# Patient Record
Sex: FEMALE | Race: WHITE | NOT HISPANIC OR LATINO | Employment: FULL TIME | URBAN - METROPOLITAN AREA
[De-identification: names, ages, dates, MRNs, and addresses within clinical notes are randomized per-mention and may not be internally consistent; named-entity substitution may affect disease eponyms.]

---

## 2017-07-18 ENCOUNTER — OFFICE VISIT (OUTPATIENT)
Dept: OBSTETRICS AND GYNECOLOGY | Facility: CLINIC | Age: 37
End: 2017-07-18

## 2017-07-18 VITALS
SYSTOLIC BLOOD PRESSURE: 143 MMHG | HEIGHT: 63 IN | BODY MASS INDEX: 30.3 KG/M2 | WEIGHT: 171 LBS | HEART RATE: 95 BPM | DIASTOLIC BLOOD PRESSURE: 82 MMHG

## 2017-07-18 DIAGNOSIS — N64.4 MASTODYNIA OF LEFT BREAST: Primary | ICD-10-CM

## 2017-07-18 PROCEDURE — 99213 OFFICE O/P EST LOW 20 MIN: CPT | Performed by: OBSTETRICS & GYNECOLOGY

## 2017-07-18 RX ORDER — ETONOGESTREL/ETHINYL ESTRADIOL .12-.015MG
RING, VAGINAL VAGINAL
COMMUNITY
Start: 2017-07-01 | End: 2017-08-22

## 2017-07-18 RX ORDER — LEVOTHYROXINE SODIUM 88 MCG
88 TABLET ORAL DAILY
COMMUNITY
Start: 2017-07-15 | End: 2020-11-02

## 2017-07-18 NOTE — PROGRESS NOTES
"Subjective   Iris Dailey is a 37 y.o. female.     Cc:  Left breast pain    History of Present Illness - 37 year old female with left breast pain intermittent for 2 months.  Localized to the upper outer left breast.  No discreet mass.  Patient feels like it is \"scar tissue\" though she has not had previous breast surgery.  She had a mammogram last year that is reported as normal.  No breast discharge.  Patient is not on hormonal medications.    The following portions of the patient's history were reviewed and updated as appropriate:   Her family history includes Colon cancer in her paternal grandfather; Diabetes in her father.  Current Outpatient Prescriptions   Medication Sig Dispense Refill   • NUVARING 0.12-0.015 MG/24HR vaginal ring      • SYNTHROID 88 MCG tablet        No current facility-administered medications for this visit.      She is allergic to codeine..    Review of Systems   Constitutional: Negative for chills and fever.   Hematological:        No nipple discharge.  Positive for breast tenderness in left breast.       Objective   Physical Exam   Constitutional: She appears well-developed.   Pulmonary/Chest: Right breast exhibits no inverted nipple, no mass, no nipple discharge and no skin change. Left breast exhibits tenderness. Left breast exhibits no inverted nipple, no mass, no nipple discharge and no skin change.   Neurological: She is alert. Coordination normal.   Skin: Skin is warm and dry.   Psychiatric: She has a normal mood and affect. Her behavior is normal. Judgment and thought content normal.   Vitals reviewed.      Assessment/Plan   Iris was seen today for breast problem.    Diagnoses and all orders for this visit:    Mastodynia of left breast  -     US breast left complete; Future  -     Mammo Diagnostic Left With CAD; Future  -     No palpable abnormalities today.  However, will schedule for breast testing.    Wilber Han MD                "

## 2017-07-28 ENCOUNTER — HOSPITAL ENCOUNTER (OUTPATIENT)
Dept: ULTRASOUND IMAGING | Facility: HOSPITAL | Age: 37
Discharge: HOME | End: 2017-07-28
Attending: OBSTETRICS & GYNECOLOGY | Admitting: OBSTETRICS & GYNECOLOGY

## 2017-07-28 ENCOUNTER — HOSPITAL ENCOUNTER (OUTPATIENT)
Dept: MAMMOGRAPHY | Facility: HOSPITAL | Age: 37
Discharge: HOME | End: 2017-07-28
Attending: OBSTETRICS & GYNECOLOGY

## 2017-07-28 DIAGNOSIS — N64.4 MASTODYNIA OF LEFT BREAST: ICD-10-CM

## 2017-07-28 PROCEDURE — G0206 DX MAMMO INCL CAD UNI: HCPCS

## 2017-07-28 PROCEDURE — 76642 ULTRASOUND BREAST LIMITED: CPT

## 2017-07-31 DIAGNOSIS — N63.20 BREAST MASS, LEFT: Primary | ICD-10-CM

## 2017-08-02 ENCOUNTER — TELEPHONE (OUTPATIENT)
Dept: OBSTETRICS AND GYNECOLOGY | Facility: CLINIC | Age: 37
End: 2017-08-02

## 2017-08-02 NOTE — TELEPHONE ENCOUNTER
Pt aware of results and waiting for a call from [facility] to be scheduled. If she does not hear back from them today she will give us a call back.

## 2017-08-02 NOTE — TELEPHONE ENCOUNTER
----- Message from Panchito Wright MD sent at 7/31/2017  5:20 PM EDT -----  Notify the patient that they saw small abnormality of the left breast, and radiology has recommended a biopsy to be performed under ultrasound.  I will order this, and the patient should be scheduled to have the procedure performed.

## 2017-08-08 ENCOUNTER — HOSPITAL ENCOUNTER (OUTPATIENT)
Dept: ULTRASOUND IMAGING | Facility: HOSPITAL | Age: 37
Discharge: HOME | End: 2017-08-08
Attending: OBSTETRICS & GYNECOLOGY | Admitting: OBSTETRICS & GYNECOLOGY

## 2017-08-08 VITALS
TEMPERATURE: 98.4 F | OXYGEN SATURATION: 100 % | HEIGHT: 63 IN | BODY MASS INDEX: 29.95 KG/M2 | SYSTOLIC BLOOD PRESSURE: 117 MMHG | DIASTOLIC BLOOD PRESSURE: 79 MMHG | WEIGHT: 169 LBS | RESPIRATION RATE: 16 BRPM | HEART RATE: 79 BPM

## 2017-08-08 DIAGNOSIS — N63.20 BREAST MASS, LEFT: ICD-10-CM

## 2017-08-08 PROCEDURE — A4648 IMPLANTABLE TISSUE MARKER: HCPCS

## 2017-08-08 PROCEDURE — 88305 TISSUE EXAM BY PATHOLOGIST: CPT | Performed by: OBSTETRICS & GYNECOLOGY

## 2017-08-08 RX ORDER — LIDOCAINE HYDROCHLORIDE 10 MG/ML
10 INJECTION, SOLUTION INFILTRATION; PERINEURAL ONCE
Status: COMPLETED | OUTPATIENT
Start: 2017-08-08 | End: 2017-08-08

## 2017-08-08 RX ORDER — LIDOCAINE HYDROCHLORIDE AND EPINEPHRINE 10; 10 MG/ML; UG/ML
10 INJECTION, SOLUTION INFILTRATION; PERINEURAL ONCE
Status: COMPLETED | OUTPATIENT
Start: 2017-08-08 | End: 2017-08-08

## 2017-08-08 RX ADMIN — LIDOCAINE HYDROCHLORIDE,EPINEPHRINE BITARTRATE 10 ML: 10; .01 INJECTION, SOLUTION INFILTRATION; PERINEURAL at 10:40

## 2017-08-08 RX ADMIN — LIDOCAINE HYDROCHLORIDE 10 ML: 10 INJECTION, SOLUTION INFILTRATION; PERINEURAL at 10:40

## 2017-08-08 NOTE — NURSING NOTE
Biopsy site to left lateral breast clear with Skin Affix dry and intact. No firmness or swelling noted at or around biopsy site. Denies pain. Ice pack with protective covering applied to biopsy site. Discharge instructions discussed with understanding voiced by patient. Copies provided to patient. No distress noted. To home via private vehicle.

## 2017-08-09 LAB
CYTO UR: NORMAL
LAB AP CASE REPORT: NORMAL
LAB AP CLINICAL INFORMATION: NORMAL
Lab: NORMAL
PATH REPORT.FINAL DX SPEC: NORMAL
PATH REPORT.GROSS SPEC: NORMAL

## 2017-08-10 ENCOUNTER — TELEPHONE (OUTPATIENT)
Dept: OBSTETRICS AND GYNECOLOGY | Facility: CLINIC | Age: 37
End: 2017-08-10

## 2017-08-10 DIAGNOSIS — D24.2 FIBROADENOMA OF LEFT BREAST: Primary | ICD-10-CM

## 2017-08-10 NOTE — TELEPHONE ENCOUNTER
----- Message from Tony Wright MD sent at 8/10/2017  1:05 PM EDT -----  Notify the patient that her breast biopsy was benign, revealing a type of mass, fibroadenoma.  This is not something cancerous; however, she should be referred to a breast surgeon to discuss follow-up versus removal of the mass. I will order the referral separately.

## 2017-08-15 ENCOUNTER — TELEPHONE (OUTPATIENT)
Dept: OBSTETRICS AND GYNECOLOGY | Facility: CLINIC | Age: 37
End: 2017-08-15

## 2017-08-15 NOTE — TELEPHONE ENCOUNTER
----- Message from Wilber Han MD sent at 8/15/2017 11:49 AM EDT -----  Linnea - Let her know that her breast biopsy results were benign -- No evidence of cancer.  Thanks.

## 2017-08-22 ENCOUNTER — OFFICE VISIT (OUTPATIENT)
Dept: MAMMOGRAPHY | Facility: CLINIC | Age: 37
End: 2017-08-22

## 2017-08-22 VITALS
HEART RATE: 93 BPM | HEIGHT: 63 IN | OXYGEN SATURATION: 98 % | SYSTOLIC BLOOD PRESSURE: 132 MMHG | TEMPERATURE: 97.8 F | DIASTOLIC BLOOD PRESSURE: 70 MMHG | WEIGHT: 171 LBS | BODY MASS INDEX: 30.3 KG/M2

## 2017-08-22 DIAGNOSIS — N63.0 LUMP OR MASS IN BREAST: Primary | ICD-10-CM

## 2017-08-22 PROCEDURE — 99204 OFFICE O/P NEW MOD 45 MIN: CPT | Performed by: SURGERY

## 2017-08-22 RX ORDER — CEFAZOLIN SODIUM 2 G/100ML
2 INJECTION, SOLUTION INTRAVENOUS ONCE
Status: CANCELLED | OUTPATIENT
Start: 2017-09-11 | End: 2017-08-22

## 2017-08-22 RX ORDER — DIAZEPAM 2 MG/1
10 TABLET ORAL ONCE
Status: CANCELLED | OUTPATIENT
Start: 2017-09-11 | End: 2017-08-22

## 2017-08-22 NOTE — PROGRESS NOTES
Chief Complaint: Iris Dailey is a 37 y.o.. female here today for Mass (left breast)        History of Present Illness:  Patient presents with breast mass.  She is a nice 37-year-old white female who has complained of some pain in the upper outer quadrant of the left breast for approximately 2 months.  The pain was more of a shooting type pain extending up into the axilla and is intermittent.  She gives no history of trauma or nipple discharge.  The patient has had a previous excisional breast biopsy in the upper outer quadrant and thought perhaps there was some scar tissue causing the symptoms although she was unable to feel a specific mass.  The patient underwent a mammogram and no abnormalities were detected.  An ultrasound however revealed a 9 mm irregular mass with some peripheral vascularity.  This was felt to be suspicious and biopsy was recommended.  That was undertaken and the pathology report reveals fragments of a fibroepithelial lesion.  There was also some non-atypical duct hyperplasia.  The patient's personal history significant for some hypothyroidism and anxiety.  Her family history is significant for a maternal uncle with lung and thyroid cancer.  There was also a gram father on the paternal side who had colon cancer.  There is no breast cancer history in the family.  I have reviewed the imaging studies and agree with the findings.      Review of Systems:  Review of Systems   Constitutional: Positive for diaphoresis, fatigue and unexpected weight change.        20 pound weight gain over 12 months   Endocrine: Positive for hot flashes.   Skin:        The patient denies any noticeable changes to the skin of the breast.    Neurological: Positive for headaches and numbness.   Psychiatric/Behavioral: Positive for decreased concentration and sleep disturbance. The patient is nervous/anxious.    All other systems reviewed and are negative.       Past Medical and Surgical History:  Breast Biopsy  History:  Patient has had the following breast biopsies:Left Breast 2003 and 2017-benign  Breast Cancer HIstory:  Patient does not have a past medical history of breast cancer.  Breast Operations, and year:  Left Breast Biopsy    History   Smoking Status   • Never Smoker   Smokeless Tobacco   • Not on file     Obstetric History:  Patient is premenopausal, first day of last period: 08/01/17  Number of pregnancies:4  Number of live births: 1  Number of abortions or miscarriages: 3  Age of delivery of first child: 16  Patient did not breast feed.  Length of time taking birth control pills:8-12 months  Patient has never taken hormone replacement    Past Surgical History:   Procedure Laterality Date   • APPENDECTOMY     • BREAST BIOPSY Left 2002    Benign   • KNEE SURGERY         Past Medical History:   Diagnosis Date   • Anxiety    • Hypothyroid        Prior Hospitalizations, other than for surgery or childbirth, and year:  none    Social History:  Patient is single.  Patient has 1 sons.    Family History:  Family History   Problem Relation Age of Onset   • Diabetes Father    • Hyperlipidemia Father    • Hypothyroidism Father    • Hyperlipidemia Mother    • Hyperthyroidism Mother    • Cervical cancer Mother    • Colon cancer Paternal Grandfather    • Colon cancer Paternal Grandmother    • Brain cancer Maternal Uncle    • Thyroid cancer Maternal Uncle        Vital Signs:  Vitals:    08/22/17 1451   BP: 132/70   Pulse: 93   Temp: 97.8 °F (36.6 °C)   SpO2: 98%       Medications:    Current Outpatient Prescriptions:     Current Outpatient Prescriptions:   •  SYNTHROID 88 MCG tablet, , Disp: , Rfl:     Physical Examination:  General Appearance:   Patient is in no distress.  She is well kept and has an overweight build.   Psychiatric:  Patient with appropriate mood and affect. Alert and oriented to self, time, and place.    Breast, RIGHT:  large sized, symmetric with the contralateral side.  Breast skin is without erythema,  edema, rashes.  There are no visible abnormalities upon inspection during the arm-raising maneuver or with hands on hips in the sitting position. There is no nipple retraction, discharge or nipple/areolar skin changes.There are no masses palpable in the sitting or supine positions.    Breast, LEFT:  large sized, symmetric with the contralateral side.  Breast skin is without erythema, edema, rashes.  There are no visible abnormalities upon inspection during the arm-raising maneuver or with hands on hips in the sitting position. There is no nipple retraction, discharge or nipple/areolar skin changes.There is some bruising in the lateral aspect of the breast.  She also has a small incision in the upper outer quadrant and medial and inferior to this was an ill-defined area of thickening measuring approximately 1.5 cm.    Lymphatic:  There is no axillary, cervical, infraclavicular, or supraclavicular adenopathy bilaterally.  Eyes:  Pupils are round and reactive to light.  Cardiovascular:  Heart rate and rhythm are regular.  Respiratory:  Lungs are clear bilaterally with no crackles or wheezes in any lung field.  Gastrointestinal:  Abdomen is soft, nondistended, and nontender.  There was no evidence of hepatosplenomegaly.  There is a right lower quadrant scar from her appendectomy.    Musculoskeletal:  Good strength in all 4 extremities.   There is good range of motion in both shoulders.    Skin:  She has multiple irregular moles on her back and chest.  She does visit a dermatologist yearly.  The patient also has a number of tattoos.    Assessment:  1. Lump or mass in breast          Plan:  The left breast mass most likely represents a fibroadenoma.  I gave her the option of following this with a repeat ultrasound in 6 months versus performing an excisional biopsy utilizing needle localization.  She prefers the latter and I have discussed the procedure in detail with her.  She understands the small risk of infection,  bleeding, and anesthesia.      CPT coding:    Next Appointment:  No Follow-up on file.

## 2017-08-24 ENCOUNTER — TRANSCRIBE ORDERS (OUTPATIENT)
Dept: ADMINISTRATIVE | Facility: HOSPITAL | Age: 37
End: 2017-08-24

## 2017-08-24 DIAGNOSIS — N63.20 LEFT BREAST MASS: Primary | ICD-10-CM

## 2017-08-24 PROBLEM — N63.0 LUMP OR MASS IN BREAST: Status: ACTIVE | Noted: 2017-08-24

## 2017-09-05 ENCOUNTER — OFFICE VISIT (OUTPATIENT)
Dept: OBSTETRICS AND GYNECOLOGY | Facility: CLINIC | Age: 37
End: 2017-09-05

## 2017-09-05 ENCOUNTER — TELEPHONE (OUTPATIENT)
Dept: OBSTETRICS AND GYNECOLOGY | Facility: CLINIC | Age: 37
End: 2017-09-05

## 2017-09-05 ENCOUNTER — APPOINTMENT (OUTPATIENT)
Dept: PREADMISSION TESTING | Facility: HOSPITAL | Age: 37
End: 2017-09-05

## 2017-09-05 VITALS
WEIGHT: 170.5 LBS | HEART RATE: 83 BPM | HEIGHT: 63 IN | DIASTOLIC BLOOD PRESSURE: 78 MMHG | RESPIRATION RATE: 16 BRPM | SYSTOLIC BLOOD PRESSURE: 118 MMHG | BODY MASS INDEX: 30.21 KG/M2 | TEMPERATURE: 97.4 F | OXYGEN SATURATION: 99 %

## 2017-09-05 VITALS
HEART RATE: 82 BPM | BODY MASS INDEX: 29.77 KG/M2 | DIASTOLIC BLOOD PRESSURE: 81 MMHG | HEIGHT: 63 IN | WEIGHT: 168 LBS | SYSTOLIC BLOOD PRESSURE: 125 MMHG

## 2017-09-05 DIAGNOSIS — Z01.419 VISIT FOR GYNECOLOGIC EXAMINATION: Primary | ICD-10-CM

## 2017-09-05 DIAGNOSIS — N63.0 LUMP OR MASS IN BREAST: ICD-10-CM

## 2017-09-05 LAB
ANION GAP SERPL CALCULATED.3IONS-SCNC: 11.1 MMOL/L
BUN BLD-MCNC: 9 MG/DL (ref 6–20)
BUN/CREAT SERPL: 9.5 (ref 7–25)
CALCIUM SPEC-SCNC: 10.1 MG/DL (ref 8.6–10.5)
CHLORIDE SERPL-SCNC: 99 MMOL/L (ref 98–107)
CO2 SERPL-SCNC: 27.9 MMOL/L (ref 22–29)
CREAT BLD-MCNC: 0.95 MG/DL (ref 0.57–1)
DEPRECATED RDW RBC AUTO: 42.2 FL (ref 37–54)
ERYTHROCYTE [DISTWIDTH] IN BLOOD BY AUTOMATED COUNT: 12.7 % (ref 11.7–13)
GFR SERPL CREATININE-BSD FRML MDRD: 65 ML/MIN/1.73
GLUCOSE BLD-MCNC: 93 MG/DL (ref 65–99)
HCG SERPL QL: NEGATIVE
HCT VFR BLD AUTO: 41 % (ref 35.6–45.5)
HGB BLD-MCNC: 13.8 G/DL (ref 11.9–15.5)
MCH RBC QN AUTO: 30.7 PG (ref 26.9–32)
MCHC RBC AUTO-ENTMCNC: 33.7 G/DL (ref 32.4–36.3)
MCV RBC AUTO: 91.1 FL (ref 80.5–98.2)
PLATELET # BLD AUTO: 242 10*3/MM3 (ref 140–500)
PMV BLD AUTO: 10.9 FL (ref 6–12)
POTASSIUM BLD-SCNC: 3.9 MMOL/L (ref 3.5–5.2)
RBC # BLD AUTO: 4.5 10*6/MM3 (ref 3.9–5.2)
SODIUM BLD-SCNC: 138 MMOL/L (ref 136–145)
WBC NRBC COR # BLD: 8.09 10*3/MM3 (ref 4.5–10.7)

## 2017-09-05 PROCEDURE — 80048 BASIC METABOLIC PNL TOTAL CA: CPT | Performed by: SURGERY

## 2017-09-05 PROCEDURE — 36415 COLL VENOUS BLD VENIPUNCTURE: CPT

## 2017-09-05 PROCEDURE — 84703 CHORIONIC GONADOTROPIN ASSAY: CPT | Performed by: SURGERY

## 2017-09-05 PROCEDURE — 99395 PREV VISIT EST AGE 18-39: CPT | Performed by: OBSTETRICS & GYNECOLOGY

## 2017-09-05 PROCEDURE — 85027 COMPLETE CBC AUTOMATED: CPT | Performed by: SURGERY

## 2017-09-05 RX ORDER — ETONOGESTREL/ETHINYL ESTRADIOL .12-.015MG
1 RING, VAGINAL VAGINAL
Qty: 1 EACH | Refills: 11 | Status: SHIPPED | OUTPATIENT
Start: 2017-09-05 | End: 2017-12-15

## 2017-09-05 NOTE — PROGRESS NOTES
[facility]  [address], [address]  [place], [place] [address]  Phone: [phone] / Fax:  [phone]      2017    [address] [place] [address]    FARIBA Sampson    Chief Complaint   Patient presents with   • Gynecologic Exam     Annual Exam, pt scheduled for a left breast bx next week.       Iris Dailey is here for annual gynecologic exam.  HPI - Patient uses Nuva Ring but not on it currently.  She wants to restart.  Patient to have breast biopsy next week for potentially precancerous lesion.      Past Medical History:   Diagnosis Date   • Anxiety    • Hypothyroid        Past Surgical History:   Procedure Laterality Date   • APPENDECTOMY     • BREAST BIOPSY Left     Benign   • KNEE SURGERY         Allergies   Allergen Reactions   • Codeine        Social History     Social History   • Marital status: Single     Spouse name: N/A   • Number of children: N/A   • Years of education: N/A     Occupational History   • Not on file.     Social History Main Topics   • Smoking status: Never Smoker   • Smokeless tobacco: Not on file   • Alcohol use No   • Drug use: No   • Sexual activity: Yes     Partners: Male     Birth control/ protection: None      Comment: single     Other Topics Concern   • Not on file     Social History Narrative       Family History   Problem Relation Age of Onset   • Diabetes Father    • Hyperlipidemia Father    • Hypothyroidism Father    • Hyperlipidemia Mother    • Hyperthyroidism Mother    • Cervical cancer Mother    • Colon cancer Paternal Grandfather    • Colon cancer Paternal Grandmother    • Brain cancer Maternal Uncle    • Thyroid cancer Maternal Uncle        Patient's last menstrual period was 2017 (exact date).    OB History      Para Term  AB TAB SAB Ectopic Multiple Living    4 1 1  3  3   1        Obstetric Comments    Took birth control 8-12 months.          Vitals:    17 1135   BP: 125/81   Pulse: 82   Weight: 168 lb (76.2  "kg)   Height: 63\" (160 cm)       Physical Exam   Constitutional: She appears well-developed and well-nourished.   Genitourinary: Vagina normal and uterus normal. Pelvic exam was performed with patient supine. There is no tenderness or lesion on the right labia. There is no tenderness or lesion on the left labia. Right adnexum does not display tenderness and does not display fullness. Left adnexum does not display tenderness and does not display fullness. Cervix does not exhibit motion tenderness or lesion.   HENT:   Head: Normocephalic.   Nose: Nose normal.   Eyes: Conjunctivae are normal.   Neck: Normal range of motion. Neck supple. No thyromegaly present.   Cardiovascular: Normal rate, regular rhythm and normal heart sounds.    Pulmonary/Chest: Effort normal. She has no wheezes. She has no rales. Right breast exhibits no mass, no nipple discharge and no skin change. Left breast exhibits no mass, no nipple discharge and no skin change.   Abdominal: Soft. There is no tenderness. There is no rebound and no guarding.   Musculoskeletal: Normal range of motion. She exhibits no edema.   Neurological: She is alert. She displays normal reflexes. Coordination normal.   Psychiatric: She has a normal mood and affect. Her behavior is normal. Judgment and thought content normal.   Vitals reviewed.      Iris was seen today for gynecologic exam.    Diagnoses and all orders for this visit:    Visit for gynecologic examination  -     NUVARING 0.12-0.015 MG/24HR vaginal ring; Insert 1 each into the vagina Every 28 (Twenty-Eight) Days.  -     Discussed importance of regular screening.  Lump or mass in breast  -     Breast biopsy next week.   -     Breast awareness discussed.        Wilber Han MD      "

## 2017-09-05 NOTE — TELEPHONE ENCOUNTER
----- Message from Wilber Han MD sent at 9/5/2017  2:28 PM EDT -----  Shahrzad    21 days with 7 days off.    Thanks    Guille  ----- Message -----     From: Shahrzad Ramirez     Sent: 9/5/2017   2:07 PM       To: MD Aracelis Guzman @ [facility] wanting to know if Rx Nuvaring should be 21 days with 7 days of being out or if you wanted it to be 28 days straight, please advise.

## 2017-09-05 NOTE — DISCHARGE INSTRUCTIONS
Take the following medications the morning of surgery with a small sip of water:  NONE    Arrive to hospital on your day of surgery at 10:30 AM.    General Instructions:  • Do not eat solid food after midnight the night before surgery.  • You may drink clear liquids day of surgery but must stop at least one hour before your hospital arrival time 9:30 AM.  • It is beneficial for you to have a clear drink that contains carbohydrates the day of surgery.  We suggest a 20 ounce bottle of Gatorade or Powerade for non-diabetic patients or a 20 ounce bottle of G2 or Powerade Zero for diabetic patients. (Pediatric patients, are not advised to drink a 20 ounce carbohydrate drink)    Clear liquids are liquids you can see through.  Nothing red in color.     Plain water                               Sports drinks  Sodas                                   Gelatin (Jell-O)  Fruit juices without pulp such as white grape juice and apple juice  Popsicles that contain no fruit or yogurt  Tea or coffee (no cream or milk added)  Gatorade / Powerade  G2 / Powerade Zero    • Infants may have breast milk up to four hours before surgery.  • Infants drinking formula may drink formula up to six hours before surgery.   • Patients who avoid smoking, chewing tobacco and alcohol for 4 weeks prior to surgery have a reduced risk of post-operative complications.  Quit smoking as many days before surgery as you can.  • Do not smoke, use chewing tobacco or drink alcohol the day of surgery.   • If applicable bring your C-PAP/ BI-PAP machine.  • Bring any papers given to you in the doctor’s office.  • Wear clean comfortable clothes and socks.  • Do not wear contact lenses or make-up.  Bring a case for your glasses.   • Bring crutches or walker if applicable.  • Leave all other valuables and jewelry at home.  • The Pre-Admission Testing nurse will instruct you to bring medications if unable to obtain an accurate list in Pre-Admission Testing.        If  you were given a blood bank ID arm band remember to bring it with you the day of surgery.    Preventing a Surgical Site Infection:  • For 2 to 3 days before surgery, avoid shaving with a razor because the razor can irritate skin and make it easier to develop an infection.  • The night prior to surgery sleep in a clean bed with clean clothing.  Do not allow pets to sleep with you.  • Shower on the morning of surgery using a fresh bar of anti-bacterial soap (such as Dial) and clean washcloth.  Dry with a clean towel and dress in clean clothing.  • Ask your surgeon if you will be receiving antibiotics prior to surgery.  • Make sure you, your family, and all healthcare providers clean their hands with soap and water or an alcohol based hand  before caring for you or your wound.    Day of surgery:  Upon arrival, a Pre-op nurse and Anesthesiologist will review your health history, obtain vital signs, and answer questions you may have.  The only belongings needed at this time will be your home medications and if applicable your C-PAP/BI-PAP machine.  If you are staying overnight your family can leave the rest of your belongings in the car and bring them to your room later.  A Pre-op nurse will start an IV and you may receive medication in preparation for surgery, including something to help you relax.  Your family will be able to see you in the Pre-op area.  While you are in surgery your family should notify the waiting room  if they leave the waiting room area and provide a contact phone number.    Please be aware that surgery does come with discomfort.  We want to make every effort to control your discomfort so please discuss any uncontrolled symptoms with your nurse.   Your doctor will most likely have prescribed pain medications.      If you are going home after surgery you will receive individualized written care instructions before being discharged.  A responsible adult must drive you to and from  the hospital on the day of your surgery and stay with you for 24 hours.    If you are staying overnight following surgery, you will be transported to your hospital room following the recovery period.  [facility] [place] [facility] has all private rooms.    If you have any questions please call Pre-Admission Testing at [phone].  Deductibles and co-payments are collected on the day of service. Please be prepared to pay the required co-pay, deductible or deposit on the day of service as defined by your plan.

## 2017-09-11 ENCOUNTER — HOSPITAL ENCOUNTER (OUTPATIENT)
Facility: HOSPITAL | Age: 37
Setting detail: HOSPITAL OUTPATIENT SURGERY
Discharge: HOME | End: 2017-09-11
Attending: SURGERY | Admitting: SURGERY

## 2017-09-11 ENCOUNTER — HOSPITAL ENCOUNTER (OUTPATIENT)
Dept: MAMMOGRAPHY | Facility: HOSPITAL | Age: 37
Discharge: HOME | End: 2017-09-11
Attending: SURGERY

## 2017-09-11 ENCOUNTER — ANESTHESIA EVENT (OUTPATIENT)
Dept: PERIOP | Facility: HOSPITAL | Age: 37
End: 2017-09-11

## 2017-09-11 ENCOUNTER — APPOINTMENT (OUTPATIENT)
Dept: GENERAL RADIOLOGY | Facility: HOSPITAL | Age: 37
End: 2017-09-11

## 2017-09-11 ENCOUNTER — ANESTHESIA (OUTPATIENT)
Dept: PERIOP | Facility: HOSPITAL | Age: 37
End: 2017-09-11

## 2017-09-11 VITALS
BODY MASS INDEX: 30.29 KG/M2 | SYSTOLIC BLOOD PRESSURE: 119 MMHG | RESPIRATION RATE: 16 BRPM | TEMPERATURE: 97.9 F | WEIGHT: 171 LBS | HEART RATE: 84 BPM | OXYGEN SATURATION: 99 % | DIASTOLIC BLOOD PRESSURE: 78 MMHG

## 2017-09-11 DIAGNOSIS — N63.20 LEFT BREAST MASS: ICD-10-CM

## 2017-09-11 DIAGNOSIS — N63.0 LUMP OR MASS IN BREAST: ICD-10-CM

## 2017-09-11 PROCEDURE — 25010000002 ONDANSETRON PER 1 MG: Performed by: NURSE ANESTHETIST, CERTIFIED REGISTERED

## 2017-09-11 PROCEDURE — 88307 TISSUE EXAM BY PATHOLOGIST: CPT | Performed by: SURGERY

## 2017-09-11 PROCEDURE — 25010000002 DEXAMETHASONE PER 1 MG: Performed by: NURSE ANESTHETIST, CERTIFIED REGISTERED

## 2017-09-11 PROCEDURE — 25010000002 MIDAZOLAM PER 1 MG: Performed by: ANESTHESIOLOGY

## 2017-09-11 PROCEDURE — 19125 EXCISION BREAST LESION: CPT | Performed by: SURGERY

## 2017-09-11 PROCEDURE — 25010000002 PROPOFOL 10 MG/ML EMULSION: Performed by: NURSE ANESTHETIST, CERTIFIED REGISTERED

## 2017-09-11 PROCEDURE — 25010000003 CEFAZOLIN IN DEXTROSE 2-4 GM/100ML-% SOLUTION: Performed by: SURGERY

## 2017-09-11 PROCEDURE — 25010000002 FENTANYL CITRATE (PF) 100 MCG/2ML SOLUTION: Performed by: NURSE ANESTHETIST, CERTIFIED REGISTERED

## 2017-09-11 PROCEDURE — 76098 X-RAY EXAM SURGICAL SPECIMEN: CPT

## 2017-09-11 RX ORDER — DIPHENHYDRAMINE HYDROCHLORIDE 50 MG/ML
12.5 INJECTION INTRAMUSCULAR; INTRAVENOUS
Status: CANCELLED | OUTPATIENT
Start: 2017-09-11

## 2017-09-11 RX ORDER — FLUMAZENIL 0.1 MG/ML
0.2 INJECTION INTRAVENOUS AS NEEDED
Status: CANCELLED | OUTPATIENT
Start: 2017-09-11

## 2017-09-11 RX ORDER — NALOXONE HCL 0.4 MG/ML
0.2 VIAL (ML) INJECTION AS NEEDED
Status: CANCELLED | OUTPATIENT
Start: 2017-09-11

## 2017-09-11 RX ORDER — MIDAZOLAM HYDROCHLORIDE 1 MG/ML
1 INJECTION INTRAMUSCULAR; INTRAVENOUS
Status: DISCONTINUED | OUTPATIENT
Start: 2017-09-11 | End: 2017-09-11 | Stop reason: HOSPADM

## 2017-09-11 RX ORDER — PROPOFOL 10 MG/ML
VIAL (ML) INTRAVENOUS AS NEEDED
Status: DISCONTINUED | OUTPATIENT
Start: 2017-09-11 | End: 2017-09-11 | Stop reason: SURG

## 2017-09-11 RX ORDER — PROMETHAZINE HYDROCHLORIDE 25 MG/ML
12.5 INJECTION, SOLUTION INTRAMUSCULAR; INTRAVENOUS ONCE AS NEEDED
Status: CANCELLED | OUTPATIENT
Start: 2017-09-11

## 2017-09-11 RX ORDER — ONDANSETRON 2 MG/ML
INJECTION INTRAMUSCULAR; INTRAVENOUS AS NEEDED
Status: DISCONTINUED | OUTPATIENT
Start: 2017-09-11 | End: 2017-09-11 | Stop reason: SURG

## 2017-09-11 RX ORDER — BUPIVACAINE HYDROCHLORIDE AND EPINEPHRINE 2.5; 5 MG/ML; UG/ML
INJECTION, SOLUTION INFILTRATION; PERINEURAL AS NEEDED
Status: DISCONTINUED | OUTPATIENT
Start: 2017-09-11 | End: 2017-09-11 | Stop reason: HOSPADM

## 2017-09-11 RX ORDER — FENTANYL CITRATE 50 UG/ML
50 INJECTION, SOLUTION INTRAMUSCULAR; INTRAVENOUS
Status: CANCELLED | OUTPATIENT
Start: 2017-09-11

## 2017-09-11 RX ORDER — ONDANSETRON 2 MG/ML
4 INJECTION INTRAMUSCULAR; INTRAVENOUS ONCE AS NEEDED
Status: CANCELLED | OUTPATIENT
Start: 2017-09-11

## 2017-09-11 RX ORDER — DEXAMETHASONE SODIUM PHOSPHATE 10 MG/ML
INJECTION INTRAMUSCULAR; INTRAVENOUS AS NEEDED
Status: DISCONTINUED | OUTPATIENT
Start: 2017-09-11 | End: 2017-09-11 | Stop reason: SURG

## 2017-09-11 RX ORDER — DIAZEPAM 2 MG/1
10 TABLET ORAL ONCE
Status: COMPLETED | OUTPATIENT
Start: 2017-09-11 | End: 2017-09-11

## 2017-09-11 RX ORDER — HYDRALAZINE HYDROCHLORIDE 20 MG/ML
5 INJECTION INTRAMUSCULAR; INTRAVENOUS
Status: CANCELLED | OUTPATIENT
Start: 2017-09-11

## 2017-09-11 RX ORDER — SODIUM CHLORIDE, SODIUM LACTATE, POTASSIUM CHLORIDE, CALCIUM CHLORIDE 600; 310; 30; 20 MG/100ML; MG/100ML; MG/100ML; MG/100ML
9 INJECTION, SOLUTION INTRAVENOUS CONTINUOUS
Status: DISCONTINUED | OUTPATIENT
Start: 2017-09-11 | End: 2017-09-11 | Stop reason: HOSPADM

## 2017-09-11 RX ORDER — PROMETHAZINE HYDROCHLORIDE 25 MG/1
25 SUPPOSITORY RECTAL ONCE AS NEEDED
Status: CANCELLED | OUTPATIENT
Start: 2017-09-11

## 2017-09-11 RX ORDER — TRAMADOL HYDROCHLORIDE 50 MG/1
50 TABLET ORAL EVERY 6 HOURS PRN
Qty: 20 TABLET | Refills: 0 | Status: SHIPPED | OUTPATIENT
Start: 2017-09-11 | End: 2017-12-15

## 2017-09-11 RX ORDER — FENTANYL CITRATE 50 UG/ML
INJECTION, SOLUTION INTRAMUSCULAR; INTRAVENOUS AS NEEDED
Status: DISCONTINUED | OUTPATIENT
Start: 2017-09-11 | End: 2017-09-11 | Stop reason: SURG

## 2017-09-11 RX ORDER — LIDOCAINE HYDROCHLORIDE 10 MG/ML
3 INJECTION, SOLUTION INFILTRATION; PERINEURAL ONCE
Status: COMPLETED | OUTPATIENT
Start: 2017-09-11 | End: 2017-09-11

## 2017-09-11 RX ORDER — SODIUM CHLORIDE 0.9 % (FLUSH) 0.9 %
1-10 SYRINGE (ML) INJECTION AS NEEDED
Status: DISCONTINUED | OUTPATIENT
Start: 2017-09-11 | End: 2017-09-11 | Stop reason: HOSPADM

## 2017-09-11 RX ORDER — MIDAZOLAM HYDROCHLORIDE 1 MG/ML
2 INJECTION INTRAMUSCULAR; INTRAVENOUS
Status: DISCONTINUED | OUTPATIENT
Start: 2017-09-11 | End: 2017-09-11 | Stop reason: HOSPADM

## 2017-09-11 RX ORDER — EPHEDRINE SULFATE 50 MG/ML
INJECTION, SOLUTION INTRAVENOUS AS NEEDED
Status: DISCONTINUED | OUTPATIENT
Start: 2017-09-11 | End: 2017-09-11 | Stop reason: SURG

## 2017-09-11 RX ORDER — CEFAZOLIN SODIUM 2 G/100ML
2 INJECTION, SOLUTION INTRAVENOUS ONCE
Status: COMPLETED | OUTPATIENT
Start: 2017-09-11 | End: 2017-09-11

## 2017-09-11 RX ORDER — SCOLOPAMINE TRANSDERMAL SYSTEM 1 MG/1
1 PATCH, EXTENDED RELEASE TRANSDERMAL ONCE
Status: DISCONTINUED | OUTPATIENT
Start: 2017-09-11 | End: 2017-09-11 | Stop reason: HOSPADM

## 2017-09-11 RX ORDER — FENTANYL CITRATE 50 UG/ML
50 INJECTION, SOLUTION INTRAMUSCULAR; INTRAVENOUS
Status: DISCONTINUED | OUTPATIENT
Start: 2017-09-11 | End: 2017-09-11 | Stop reason: HOSPADM

## 2017-09-11 RX ORDER — LIDOCAINE HYDROCHLORIDE 20 MG/ML
INJECTION, SOLUTION INFILTRATION; PERINEURAL AS NEEDED
Status: DISCONTINUED | OUTPATIENT
Start: 2017-09-11 | End: 2017-09-11 | Stop reason: SURG

## 2017-09-11 RX ORDER — LABETALOL HYDROCHLORIDE 5 MG/ML
5 INJECTION, SOLUTION INTRAVENOUS
Status: CANCELLED | OUTPATIENT
Start: 2017-09-11

## 2017-09-11 RX ORDER — PROMETHAZINE HYDROCHLORIDE 25 MG/1
25 TABLET ORAL ONCE AS NEEDED
Status: CANCELLED | OUTPATIENT
Start: 2017-09-11

## 2017-09-11 RX ORDER — EPHEDRINE SULFATE 50 MG/ML
5 INJECTION, SOLUTION INTRAVENOUS ONCE AS NEEDED
Status: CANCELLED | OUTPATIENT
Start: 2017-09-11

## 2017-09-11 RX ORDER — FAMOTIDINE 10 MG/ML
20 INJECTION, SOLUTION INTRAVENOUS ONCE
Status: COMPLETED | OUTPATIENT
Start: 2017-09-11 | End: 2017-09-11

## 2017-09-11 RX ORDER — PROMETHAZINE HYDROCHLORIDE 25 MG/1
12.5 TABLET ORAL ONCE AS NEEDED
Status: CANCELLED | OUTPATIENT
Start: 2017-09-11 | End: 2017-09-12

## 2017-09-11 RX ADMIN — ONDANSETRON 4 MG: 2 INJECTION INTRAMUSCULAR; INTRAVENOUS at 14:33

## 2017-09-11 RX ADMIN — SODIUM CHLORIDE, POTASSIUM CHLORIDE, SODIUM LACTATE AND CALCIUM CHLORIDE 9 ML/HR: 600; 310; 30; 20 INJECTION, SOLUTION INTRAVENOUS at 13:16

## 2017-09-11 RX ADMIN — MIDAZOLAM 1 MG: 1 INJECTION INTRAMUSCULAR; INTRAVENOUS at 13:24

## 2017-09-11 RX ADMIN — DEXAMETHASONE SODIUM PHOSPHATE 4 MG: 10 INJECTION INTRAMUSCULAR; INTRAVENOUS at 14:33

## 2017-09-11 RX ADMIN — MIDAZOLAM 1 MG: 1 INJECTION INTRAMUSCULAR; INTRAVENOUS at 13:17

## 2017-09-11 RX ADMIN — CEFAZOLIN SODIUM 2 G: 2 INJECTION, SOLUTION INTRAVENOUS at 14:07

## 2017-09-11 RX ADMIN — FENTANYL CITRATE 50 MCG: 50 INJECTION INTRAMUSCULAR; INTRAVENOUS at 14:19

## 2017-09-11 RX ADMIN — DIAZEPAM 10 MG: 5 TABLET ORAL at 11:32

## 2017-09-11 RX ADMIN — FAMOTIDINE 20 MG: 10 INJECTION, SOLUTION INTRAVENOUS at 13:16

## 2017-09-11 RX ADMIN — FENTANYL CITRATE 50 MCG: 50 INJECTION INTRAMUSCULAR; INTRAVENOUS at 14:10

## 2017-09-11 RX ADMIN — EPHEDRINE SULFATE 10 MG: 50 INJECTION INTRAMUSCULAR; INTRAVENOUS; SUBCUTANEOUS at 14:57

## 2017-09-11 RX ADMIN — PROPOFOL 200 MG: 10 INJECTION, EMULSION INTRAVENOUS at 14:10

## 2017-09-11 RX ADMIN — LIDOCAINE HYDROCHLORIDE 100 MG: 20 INJECTION, SOLUTION INFILTRATION; PERINEURAL at 14:10

## 2017-09-11 RX ADMIN — LIDOCAINE HYDROCHLORIDE 3 ML: 10 INJECTION, SOLUTION INFILTRATION; PERINEURAL at 13:14

## 2017-09-11 RX ADMIN — SCOLOPAMINE TRANSDERMAL SYSTEM 1 PATCH: 1 PATCH, EXTENDED RELEASE TRANSDERMAL at 13:16

## 2017-09-11 NOTE — ANESTHESIA PROCEDURE NOTES
Airway  Urgency: elective    Airway not difficult    General Information and Staff    Patient location during procedure: OR  Anesthesiologist: LINETTE SILVA  CRNA: KRISTA SANTO    Indications and Patient Condition  Indications for airway management: airway protection    Preoxygenated: yes  MILS not maintained throughout  Mask difficulty assessment: 1 - vent by mask    Final Airway Details  Final airway type: supraglottic airway      Successful airway: classic  Size 4    Number of attempts at approach: 1    Additional Comments  Lma insertion appears atraumatic. Dentition intact.

## 2017-09-11 NOTE — OP NOTE
Needle Localization Breast Biopsy Procedure Note    Indications: This patient has an abnormal mammogram andunderwent preoperative guidewire localization of the mammographic abnormality.    Pre-operative Diagnosis: Needle localized Left Breast excisional biopsy    Post-operative Diagnosis: Same    Surgeon: Malcolm Shannon MD.,  FACS    Assistants: None    Anesthesia: General      Procedure Details   The patient was seen in the Holding Room. The risks, benefits, complications, treatment options, and expected outcomes were discussed with the patient. The possibilities of reaction to medication, pulmonary aspiration, bleeding, infection, the need for additional procedures, failure to diagnose a condition, and creating a complication requiring transfusion or operation were discussed with the patient. The patient concurred with the proposed plan, giving informed consent.  The site of surgery properly noted/marked. The patient was taken to Operating Room; identified patient as Iris Dailey  and the procedure verified as Needle localized left breast biopsy. A Time Out was held and the above information confirmed.    The patient underwent preoperative guidewire localization of a mammographic abnormality in the  upper outer aspect of the leftbreast.  The patient was brought to the operating room and placed supine.  The breast was prepped and draped in standard fashion. Marcaine  0.25% with epinephrine was used to anesthetize the skin at the site of the guidewire placement.  A curvilinear incision was created.  Dissection was carried down to the localized area which was completely excised.  A specimen radiograph confirmed inclusion of the preoperatively identified mammographic lesion.  Hemostasis was achieved with cautery.  Closure was performed  with interrupted 3-0 Vicryl sutures and a 4-0 Vicryl subcuticular closure.      Steri-Strips were applied. At the end of the operation, all sponge, instrument, and needle counts  were correct.    Findings:  There were no unexpected findings  Estimated Blood Loss:  Minimal           Drains: none          Specimens: Left breast tissue                    Complications:  None; patient tolerated the procedure well.           Condition: stable

## 2017-09-11 NOTE — PLAN OF CARE
Problem: Patient Care Overview (Adult)  Goal: Plan of Care Review  Outcome: Outcome(s) achieved Date Met:  09/11/17  Goal: Discharge Needs Assessment  Outcome: Outcome(s) achieved Date Met:  09/11/17    Problem: Perioperative Period (Adult)  Intervention: Promote Pulmonary Hygiene and Secretion Clearance    09/11/17 1631   Promote Aggressive Pulmonary Hygiene/Secretion Management   Cough And Deep Breathing done independently per patient   Activity   Activity Type activity adjusted per tolerance         Goal: Signs and Symptoms of Listed Potential Problems Will be Absent or Manageable (Perioperative Period)  Outcome: Outcome(s) achieved Date Met:  09/11/17

## 2017-09-11 NOTE — PLAN OF CARE
Problem: Perioperative Period (Adult)  Goal: Signs and Symptoms of Listed Potential Problems Will be Absent or Manageable (Perioperative Period)  Outcome: Ongoing (interventions implemented as appropriate)    09/11/17 1527   Perioperative Period   Problems Assessed (Perioperative Period) pain;wound complications;embolism;hemorrhage;hypothermia;hypoxia/hypoxemia   Problems Present (Perioperative Period) none

## 2017-09-11 NOTE — ANESTHESIA PREPROCEDURE EVALUATION
Anesthesia Evaluation     Patient summary reviewed and Nursing notes reviewed   history of anesthetic complications: PONV         Airway   Mallampati: II  no difficulty expected  Dental - normal exam     Pulmonary     breath sounds clear to auscultation  Cardiovascular     Rhythm: regular  Rate: normal        Neuro/Psych  (+) psychiatric history,    GI/Hepatic/Renal/Endo    (+)  hypothyroidism,     Musculoskeletal     Abdominal    Substance History      OB/GYN          Other                                        Anesthesia Plan    ASA 2     general     intravenous induction   Anesthetic plan and risks discussed with patient.

## 2017-09-11 NOTE — ANESTHESIA POSTPROCEDURE EVALUATION
Patient: Iris GRANDA Dailey    Procedure Summary     Date Anesthesia Start Anesthesia Stop Room / Location    09/11/17 1407 [id number]  [facility]  /  [facility]       Procedure Diagnosis Surgeon Provider    EXCISIONAL BIOPSY OF LEFT BREAST MASS WITH NEEDLE LOCALIZATION (Left Breast) Lump or mass in breast  (Lump or mass in breast [N63]) MD Darío Birmingham MD          Anesthesia Type: general  Last vitals  BP        Temp        Pulse       Resp        SpO2          Post Anesthesia Care and Evaluation    Patient location during evaluation: bedside  Patient participation: complete - patient participated  Level of consciousness: awake  Pain score: 2  Pain management: adequate  Airway patency: patent  Anesthetic complications: No anesthetic complications    Cardiovascular status: acceptable  Respiratory status: acceptable  Hydration status: acceptable    Comments: /81 (BP Location: Right arm, Patient Position: Lying)  Pulse 74  Temp 36.8 °C (98.2 °F)  Resp 16  Wt 171 lb (77.6 kg)  LMP 08/27/2017 (Exact Date)  SpO2 100%  BMI 30.29 kg/m2

## 2017-09-13 ENCOUNTER — TELEPHONE (OUTPATIENT)
Dept: MAMMOGRAPHY | Facility: CLINIC | Age: 37
End: 2017-09-13

## 2017-09-13 LAB
CYTO UR: NORMAL
LAB AP CASE REPORT: NORMAL
Lab: NORMAL
PATH REPORT.FINAL DX SPEC: NORMAL
PATH REPORT.GROSS SPEC: NORMAL

## 2017-09-13 NOTE — TELEPHONE ENCOUNTER
I told her that the pathology report revealed a fibroadenoma.  She is doing well from her surgery and I will see her in 2 weeks.

## 2017-09-26 ENCOUNTER — OFFICE VISIT (OUTPATIENT)
Dept: MAMMOGRAPHY | Facility: CLINIC | Age: 37
End: 2017-09-26

## 2017-09-26 VITALS
SYSTOLIC BLOOD PRESSURE: 105 MMHG | HEART RATE: 81 BPM | TEMPERATURE: 98 F | OXYGEN SATURATION: 97 % | DIASTOLIC BLOOD PRESSURE: 62 MMHG

## 2017-09-26 DIAGNOSIS — D24.2 FIBROADENOMA OF LEFT BREAST: Primary | ICD-10-CM

## 2017-09-26 PROCEDURE — 99024 POSTOP FOLLOW-UP VISIT: CPT | Performed by: SURGERY

## 2017-09-26 NOTE — PROGRESS NOTES
Chief Complaint: Iris Dailey is a  37 y.o. female, initially referred by No ref. provider found , who is here today for a postoperative visit.    History of Present Illness:  In the interim,Iris Dailey has had the following procedure and resultant pathology report: Needle localized left breast biopsy.  The path report shows a small fibroadenoma with some surrounding labral cystic change.  There is no evidence of atypia or malignancy.    She has noted no redness, warmth,drainage, swelling at the incision site. Denies fever or chills.      Current Outpatient Prescriptions:   •  NUVARING 0.12-0.015 MG/24HR vaginal ring, Insert 1 each into the vagina Every 28 (Twenty-Eight) Days., Disp: 1 each, Rfl: 11  •  SYNTHROID 88 MCG tablet, Take 88 mcg by mouth Daily., Disp: , Rfl:   •  traMADol (ULTRAM) 50 MG tablet, Take 1 tablet by mouth Every 6 (Six) Hours As Needed for Moderate Pain ., Disp: 20 tablet, Rfl: 0  Physical examination  Left breast-there is a nicely healing incision in the upper outer quadrant without signs of redness or drainage.  Assessment:  Fibroadenoma left breast-status post excision.  She is healing nicely.    Plan:  I recommended that she obtain a mammogram next year.  I will just see her on an as-needed basis.          EMR Dragon/transcription disclaimer:    Much of this encounter note is an electronic transcription/translocation of spoken language to printed text.  The electronic translation of spoken language may permit erroneous, or at times, nonsensical words or phrases to be inadvertently transcribed.  Although I have reviewed the note from such areas, some may still exist.

## 2017-12-15 ENCOUNTER — OFFICE VISIT (OUTPATIENT)
Dept: MAMMOGRAPHY | Facility: CLINIC | Age: 37
End: 2017-12-15

## 2017-12-15 VITALS
DIASTOLIC BLOOD PRESSURE: 80 MMHG | WEIGHT: 168 LBS | BODY MASS INDEX: 29.77 KG/M2 | SYSTOLIC BLOOD PRESSURE: 130 MMHG | OXYGEN SATURATION: 98 % | TEMPERATURE: 98.3 F | HEIGHT: 63 IN | HEART RATE: 99 BPM

## 2017-12-15 DIAGNOSIS — N63.0 LUMP OR MASS IN BREAST: Primary | ICD-10-CM

## 2017-12-15 PROCEDURE — 99213 OFFICE O/P EST LOW 20 MIN: CPT | Performed by: SURGERY

## 2017-12-15 RX ORDER — CEFDINIR 300 MG/1
CAPSULE ORAL
COMMUNITY
Start: 2017-12-07 | End: 2018-01-24

## 2017-12-15 NOTE — PROGRESS NOTES
Chief Complaint: Iris Dailey is a 37 y.o.. female here today for Mass and Pain        History of Present Illness:  Patient presents with breast mass.  I last saw her in September 2017.  At that time she had undergone removal of a fibroadenoma from the left breast.  Over the last 3 months or so, the patient has felt a mass like area in the upper outer quadrant near the tail of the breast.  The mass appears to be 2-1/2 cm or so in size.  It is associated with tenderness but also seems to increase and decrease in size.  In fact, she feels like it is hard to find today.  Her menstrual cycle has been very irregular since she was taken off her Nuvaring.  Approximately 6 months ago she underwent a mammogram and a limited ultrasound of the left breast.  She denies any trauma to the breast and his had no nipple discharge.      Review of Systems:  Review of Systems   Skin:        The pt denies any noticeable changes to the skin of the breast   All other systems reviewed and are negative.       Past Medical and Surgical History:  Breast Biopsy History:  Patient has had the following breast biopsies:Left breast 2013 and 2017-benign  Breast Cancer HIstory:  Patient does not have a past medical history of breast cancer.  Breast Operations, and year:  Left breast excisional biopsy 2017     History   Smoking Status   • Never Smoker   Smokeless Tobacco   • Never Used     Obstetric History:  Patient is premenopausal, first day of last period: with weeks  Number of pregnancies:4  Number of live births: 1  Number of abortions or miscarriages: 3  Age of delivery of first child: 16  Patient did not breast feed.  Length of time taking birth control pills:8-12 months  Patient has never taken hormone replacement    Past Surgical History:   Procedure Laterality Date   • APPENDECTOMY     • BREAST BIOPSY Left 2002    Benign   • BREAST BIOPSY Left 9/11/2017    Procedure: EXCISIONAL BIOPSY OF LEFT BREAST MASS WITH NEEDLE LOCALIZATION;   Surgeon: Malcolm Shannon MD;  Location[place] [facility] [place] [facility] [place]  Service:    • DILATATION AND CURETTAGE     • KNEE ARTHROSCOPY Right        Past Medical History:   Diagnosis Date   • Anxiety    • History of MRSA infection     LEGS, 7-8 YEARS AGO, URGENT CARE IN [place]   • Hypothyroid    • Kidney stone 07-07-07   • Left breast mass    • PONV (postoperative nausea and vomiting)        Prior Hospitalizations, other than for surgery or childbirth, and year:  none    Social History:  Patient is single.  Patient has 1 sons.    Family History:  Family History   Problem Relation Age of Onset   • Diabetes Father    • Hyperlipidemia Father    • Hypothyroidism Father    • Hyperlipidemia Mother    • Hyperthyroidism Mother    • Cervical cancer Mother    • Diabetes Mother    • Colon cancer Paternal Grandfather    • Colon cancer Paternal Grandmother    • Hypothyroidism Paternal Grandmother      hypothyroidism   • Brain cancer Maternal Uncle    • Thyroid cancer Maternal Uncle    • Brain cancer Maternal Uncle    • Hyperthyroidism Maternal Uncle      hyerthyroidism/thyroid cancer   • Malig Hyperthermia Neg Hx        Vital Signs:  Vitals:    12/15/17 1112   BP: 130/80   Pulse: 99   Temp: 98.3 °F (36.8 °C)   SpO2: 98%       Medications:    Current Outpatient Prescriptions:     Current Outpatient Prescriptions:   •  cefdinir (OMNICEF) 300 MG capsule, , Disp: , Rfl:   •  SYNTHROID 88 MCG tablet, Take 88 mcg by mouth Daily., Disp: , Rfl:     Physical Examination:  General Appearance:   Patient is in no distress.  She is well kept and has an average build.   Psychiatric:  Patient with appropriate mood and affect. Alert and oriented to self, time, and place.    Breast, RIGHT:  large sized, symmetric with the contralateral side.  Breast skin is without erythema, edema, rashes.  There are no visible abnormalities upon inspection during the arm-raising maneuver or with hands on hips in the sitting position. There is no nipple retraction,  discharge or nipple/areolar skin changes.There are no masses palpable in the sitting or supine positions.    Breast, LEFT:  large sized, symmetric with the contralateral side.  Breast skin is without erythema, edema, rashes.  There are no visible abnormalities upon inspection during the arm-raising maneuver or with hands on hips in the sitting position. There is no nipple retraction, discharge or nipple/areolar skin changes.  She has a scar in the upper outer quadrant from her recent biopsy.  It is healing nicely.  The area in question is near the edge of the pectoralis major muscle in the upper outer quadrant.  I can feel some nodularity to the breast tissue in the tail but nothing that stands out as a dominant mass.    Lymphatic:  There is no axillary, cervical, infraclavicular, or supraclavicular adenopathy bilaterally.    Cardiovascular:  Heart rate and rhythm are regular.  Respiratory:  Lungs are clear bilaterally with no crackles or wheezes in any lung field.  Gastrointestinal:  Abdomen is soft, nondistended, and nontender.  There was no evidence of hepatosplenomegaly or abdominal mass There are no scars from previous surgery.    Musculoskeletal:  Good strength in all 4 extremities.   There is good range of motion in both shoulders.    Skin:   She has a tattoo of the left chest and one on her right upper extremity    Assessment:  1. Lump or mass in breast      I feel that this palpable area is just fibrocystic nodularity in the tail of the breast.  Its location makes it a bit more prominent.  I gave her the option of just following this area or obtaining some imaging studies.  She prefers just to follow it and I think that is a good option.    Plan:  The patient will perform monthly self breast examination and obtain imaging studies in May when she turns 38.  She will call me if she detects any concerning changes.      CPT coding:    Next Appointment:  No Follow-up on file.            EMR Dragon/transcription  disclaimer:    Much of this encounter note is an electronic transcription/translocation of spoken language to printed text.  The electronic translation of spoken language may permit erroneous, or at times, nonsensical words or phrases to be inadvertently transcribed.  Although I have reviewed the note from such areas, some may still exist.

## 2018-01-24 ENCOUNTER — OFFICE VISIT (OUTPATIENT)
Dept: OBSTETRICS AND GYNECOLOGY | Facility: CLINIC | Age: 38
End: 2018-01-24

## 2018-01-24 VITALS
HEART RATE: 111 BPM | DIASTOLIC BLOOD PRESSURE: 82 MMHG | BODY MASS INDEX: 29.76 KG/M2 | WEIGHT: 168 LBS | SYSTOLIC BLOOD PRESSURE: 137 MMHG

## 2018-01-24 DIAGNOSIS — O20.0 ABORTION, THREATENED: Primary | ICD-10-CM

## 2018-01-24 PROCEDURE — 99214 OFFICE O/P EST MOD 30 MIN: CPT | Performed by: OBSTETRICS & GYNECOLOGY

## 2018-01-24 NOTE — PROGRESS NOTES
Subjective   Iris Dailey is a 37 y.o. female.     Cc:  Bleeding with positive pregnancy test.    History of Present Illness - Patient is a 37 year old female who presents for evaluation of bleeding and recent positive pregnancy test.  Patient was on Nuva Ring for birth control.  She is compliant with use.  Last week, she began to experience heavy bleeding at the time of her regular cycle.  She went to the ER, eventually, and was diagnosed with pregnancy.  No abdominal pain.  Bleeding progressed and then rapidly began to slow down.  Patient unaware of passage of tissue.  She did not have an hCG level or ultrasound in the ER.  She has minimal bleeding currently.      The following portions of the patient's history were reviewed and updated as appropriate:   She  has a past medical history of Anxiety; History of MRSA infection; Hypothyroid; Kidney stone (07-07-07); Left breast mass; and PONV (postoperative nausea and vomiting).  Her family history includes Brain cancer in her maternal uncle; Cervical cancer in her mother; Colon cancer in her paternal grandfather and paternal grandmother; Diabetes in her mother; Heart disease in her maternal grandfather, paternal grandfather, and paternal grandmother; Hyperlipidemia in her father and mother; Hyperthyroidism in her maternal uncle; Hypothyroidism in her paternal grandmother; Lymphoma in her maternal grandmother; Thyroid cancer in her maternal uncle. There is no history of Malig Hyperthermia.  She  reports that she has never smoked. She has never used smokeless tobacco. She reports that she does not drink alcohol or use illicit drugs.  Current Outpatient Prescriptions   Medication Sig Dispense Refill   • SYNTHROID 88 MCG tablet Take 88 mcg by mouth Daily.       No current facility-administered medications for this visit.      She is allergic to codeine..    Review of Systems   Constitutional: Negative for chills and fever.   Gastrointestinal: Negative for abdominal  distention and abdominal pain.   Genitourinary: Positive for vaginal bleeding. Negative for dysuria and pelvic pain.       Objective   Physical Exam   Constitutional: She appears well-developed and well-nourished.   Abdominal: Soft. There is no tenderness. There is no rebound and no guarding.   Genitourinary: Uterus normal. Pelvic exam was performed with patient supine. There is no tenderness or lesion on the right labia. There is no tenderness or lesion on the left labia. Cervix exhibits no motion tenderness and no friability. Right adnexum displays no mass and no tenderness. Left adnexum displays no mass and no tenderness. There is bleeding in the vagina.   Musculoskeletal: Normal range of motion. She exhibits no edema.   Neurological: She is alert. She displays normal reflexes. Coordination normal.   Skin: Skin is warm and dry.   Psychiatric: She has a normal mood and affect. Her behavior is normal. Judgment and thought content normal.   Vitals reviewed.    Reviewed records from outside ER including labs.    Assessment/Plan   Iris was seen today for follow-up.    Diagnoses and all orders for this visit:    , threatened  - Patient with no pain or bleeding currently.  Exam benign.  I recommend close follow up with serial hCG levels and ultrasound if/when appropriate.  If pain or bleeding ensue again, patient needs to notify me.  There is a reasonable likelihood that patient had a completed .  Discussed contraception failure issues.    Wilber Han MD    I spent 25 minutes with patient and greater than 15 minutes in counseling.

## 2018-01-26 ENCOUNTER — TELEPHONE (OUTPATIENT)
Dept: OBSTETRICS AND GYNECOLOGY | Facility: CLINIC | Age: 38
End: 2018-01-26

## 2018-01-26 DIAGNOSIS — O20.0 THREATENED ABORTION: Primary | ICD-10-CM

## 2018-01-26 LAB — HCG INTACT+B SERPL-ACNC: NORMAL MIU/ML

## 2018-01-26 NOTE — TELEPHONE ENCOUNTER
Noted    ----- Message from Odette Medley sent at 1/26/2018  1:26 PM EST -----  Contact: Patient  Patient called stating that she did go to [facility] and is going to have them fax over any lab results or notes.     Call back # [phone]

## 2018-01-26 NOTE — TELEPHONE ENCOUNTER
"I spoke with patient.  She had blood drawn, drove back to [place] and then began bleeding.  It is currently moderate to heavy.      I offered patient three options    1) go to [facility] to be seen.  2) return to [facility] for ultrasound.  3) go home and observe for a period of time.    Patient is going to observe and then decide what course to take.                ----- Message from Iwona George sent at 1/26/2018 11:51 AM EST -----  Pt stated that she was just seen in the office Wednesday and today for blood work to recheck her levels, she wasn't bleeding when she was here. She had her blood work done then made her way back to work and now that she is back at work she is bleeding bad enough that it went through her pad and it's like she \"is peeing but its just blood\" She isn't sure what to do and she believes that she's having a miscarriage?     Call back #: [phone]    "

## 2018-02-12 ENCOUNTER — OFFICE VISIT (OUTPATIENT)
Dept: OBSTETRICS AND GYNECOLOGY | Facility: CLINIC | Age: 38
End: 2018-02-12

## 2018-02-12 VITALS
HEART RATE: 85 BPM | BODY MASS INDEX: 29.77 KG/M2 | DIASTOLIC BLOOD PRESSURE: 69 MMHG | HEIGHT: 63 IN | WEIGHT: 168 LBS | SYSTOLIC BLOOD PRESSURE: 109 MMHG

## 2018-02-12 DIAGNOSIS — O02.1 MISSED ABORTION: Primary | ICD-10-CM

## 2018-02-12 DIAGNOSIS — Z30.40 ENCOUNTER FOR SURVEILLANCE OF CONTRACEPTIVES, UNSPECIFIED CONTRACEPTIVE: ICD-10-CM

## 2018-02-12 PROCEDURE — 81025 URINE PREGNANCY TEST: CPT | Performed by: OBSTETRICS & GYNECOLOGY

## 2018-02-12 PROCEDURE — 99213 OFFICE O/P EST LOW 20 MIN: CPT | Performed by: OBSTETRICS & GYNECOLOGY

## 2018-02-13 RX ORDER — ETONOGESTREL AND ETHINYL ESTRADIOL 11.7; 2.7 MG/1; MG/1
1 INSERT, EXTENDED RELEASE VAGINAL
Qty: 1 EACH | Refills: 11 | Status: SHIPPED | OUTPATIENT
Start: 2018-02-13 | End: 2018-09-10 | Stop reason: SDUPTHER

## 2018-02-13 NOTE — PROGRESS NOTES
Subjective   Iris Dailey is a 37 y.o. female.     Cc:  Recent miscarriage    History of Present Illness - 37 year old female who presents for follow up.  Patient was followed by me for threatened/inevitable .  She had suboptimally rising hCG levels.  Ultimately, she began bleeding and cramping and went to [facility] several weeks ago.  Inevitable  was diagnosed and patient underwent a D&C.  She is doing well at this time.  No further bleeding or spotting.  No fevers or chills.  No abdominal pain.  Patient would like to start on birth control.  She is consider tubal sterilization.    The following portions of the patient's history were reviewed and updated as appropriate:   She  has a past medical history of Anxiety; History of MRSA infection; Hypothyroid; Kidney stone (07); Left breast mass; and PONV (postoperative nausea and vomiting).  She  reports that she has never smoked. She has never used smokeless tobacco. She reports that she does not drink alcohol or use illicit drugs.  Current Outpatient Prescriptions   Medication Sig Dispense Refill   • etonogestrel-ethinyl estradiol (NUVARING) 0.12-0.015 MG/24HR vaginal ring Insert 1 each into the vagina Every 28 (Twenty-Eight) Days. Insert vaginally  for 3 consecutive weeks, then remove for 1 week. 1 each 11   • SYNTHROID 88 MCG tablet Take 88 mcg by mouth Daily.       No current facility-administered medications for this visit.      She is allergic to codeine..    Review of Systems   Constitutional: Negative for chills and fever.   Gastrointestinal: Negative for abdominal pain and nausea.   Genitourinary: Negative for dysuria, frequency and pelvic pain.       Objective   Physical Exam   Constitutional: She appears well-developed and well-nourished.   Abdominal: Soft. There is no tenderness. There is no rebound and no guarding.   Genitourinary: Vagina normal and uterus normal. Pelvic exam was performed with patient supine. There is  no tenderness or lesion on the right labia. There is no tenderness or lesion on the left labia. Cervix exhibits no motion tenderness and no friability. Right adnexum displays no mass and no tenderness. Left adnexum displays no mass and no tenderness.   Neurological: She is alert. Coordination normal.   Skin: Skin is warm and dry. No erythema.   Psychiatric: She has a normal mood and affect. Her behavior is normal. Judgment and thought content normal.   Vitals reviewed.      Assessment/Plan   Iris was seen today for post-op.    Diagnoses and all orders for this visit:    Missed        -      Resolved.   Begin contraception.  Encounter for surveillance of contraceptives, unspecified contraceptive  -     etonogestrel-ethinyl estradiol (NUVARING) 0.12-0.015 MG/24HR vaginal ring; Insert 1 each into the vagina Every 28 (Twenty-Eight) Days. Insert vaginally  for 3 consecutive weeks, then remove for 1 week.  -      Discussed tubal sterilization at length.  Discussed LTC versus ESSURE.  Discussed each method and the pros/cons and risks.  Patient given pamphlets on each.  Sterilization papers signed.    Wilber Han MD

## 2018-02-14 LAB
B-HCG UR QL: NEGATIVE
INTERNAL NEGATIVE CONTROL: NEGATIVE
INTERNAL POSITIVE CONTROL: POSITIVE
Lab: NORMAL

## 2018-03-14 ENCOUNTER — OFFICE VISIT (OUTPATIENT)
Dept: OBSTETRICS AND GYNECOLOGY | Facility: CLINIC | Age: 38
End: 2018-03-14

## 2018-03-14 VITALS
WEIGHT: 164 LBS | HEIGHT: 55 IN | DIASTOLIC BLOOD PRESSURE: 81 MMHG | BODY MASS INDEX: 37.95 KG/M2 | HEART RATE: 98 BPM | SYSTOLIC BLOOD PRESSURE: 137 MMHG

## 2018-03-14 DIAGNOSIS — N92.0 MENORRHAGIA WITH REGULAR CYCLE: Primary | ICD-10-CM

## 2018-03-14 PROCEDURE — 99213 OFFICE O/P EST LOW 20 MIN: CPT | Performed by: OBSTETRICS & GYNECOLOGY

## 2018-03-14 RX ORDER — IBUPROFEN 600 MG/1
TABLET ORAL
Refills: 1 | COMMUNITY
Start: 2018-01-26 | End: 2018-09-26 | Stop reason: HOSPADM

## 2018-03-14 NOTE — PROGRESS NOTES
Subjective   Iris Dailey is a 37 y.o. female.     Cc:  Heavy cycle    History of Present Illness - Patient is a 37 year old female who had a miscarriage within the past 4 to 8 weeks.  Patient started Nuva Ring after miscarriage was completed.  She had her first cycle subsequently and it was heavy.  Patient required the use of 8 to 10 pads on heaviest day.  Cramping was moderate to severe but patient got relief with the use of NSAIDs.  She placed a new ring recently and bleeding improved.  Today, she feels better.  No clotting or significant cramping.  Patient has not had work up or had an interventions for this recent issue.    The following portions of the patient's history were reviewed and updated as appropriate:   She  has a past medical history of Anxiety; History of MRSA infection; Hypothyroid; Kidney stone (07-07-07); Left breast mass; and PONV (postoperative nausea and vomiting).  She  reports that she has never smoked. She has never used smokeless tobacco. She reports that she does not drink alcohol or use drugs.  Current Outpatient Prescriptions on File Prior to Visit   Medication Sig   • etonogestrel-ethinyl estradiol (NUVARING) 0.12-0.015 MG/24HR vaginal ring Insert 1 each into the vagina Every 28 (Twenty-Eight) Days. Insert vaginally  for 3 consecutive weeks, then remove for 1 week.   • SYNTHROID 88 MCG tablet Take 88 mcg by mouth Daily.     No current facility-administered medications on file prior to visit.      She is allergic to codeine..    Review of Systems   Constitutional: Negative for chills, fatigue and fever.   Gastrointestinal: Negative for nausea and vomiting.   Genitourinary: Positive for menstrual problem, pelvic pain and vaginal bleeding. Negative for vaginal discharge.       Objective   Physical Exam   Constitutional: She appears well-developed and well-nourished.   Abdominal: Soft. There is no tenderness. There is no guarding.   Genitourinary: Vagina normal and uterus normal. Pelvic  exam was performed with patient supine. There is no tenderness or lesion on the right labia. There is no tenderness or lesion on the left labia. Cervix exhibits no motion tenderness. Right adnexum displays no mass and no tenderness. Left adnexum displays no mass and no tenderness.   Neurological: She is alert. Coordination normal.   Skin: Skin is warm and dry.   Psychiatric: She has a normal mood and affect. Her behavior is normal. Judgment and thought content normal.   Vitals reviewed.      Assessment/Plan   Iris was seen today for menorrhagia and dysmenorrhea.    Diagnoses and all orders for this visit:    Menorrhagia with regular cycle  -     CBC (No Diff)  -     HCG, B-subunit, Quantitative  -     Exam normal.  Reassurance given.  If bleeding/pain recur, patient to return for work up including sonogram.    Wilber Han MD

## 2018-03-15 ENCOUNTER — TELEPHONE (OUTPATIENT)
Dept: OBSTETRICS AND GYNECOLOGY | Facility: CLINIC | Age: 38
End: 2018-03-15

## 2018-03-15 LAB
ERYTHROCYTE [DISTWIDTH] IN BLOOD BY AUTOMATED COUNT: 13.5 % (ref 11.7–13)
HCG INTACT+B SERPL-ACNC: <0.5 MIU/ML
HCT VFR BLD AUTO: 36.2 % (ref 35.6–45.5)
HGB BLD-MCNC: 11 G/DL (ref 11.9–15.5)
MCH RBC QN AUTO: 26.3 PG (ref 26.9–32)
MCHC RBC AUTO-ENTMCNC: 30.4 G/DL (ref 32.4–36.3)
MCV RBC AUTO: 86.4 FL (ref 80.5–98.2)
PLATELET # BLD AUTO: 294 10*3/MM3 (ref 140–500)
RBC # BLD AUTO: 4.19 10*6/MM3 (ref 3.9–5.2)
WBC # BLD AUTO: 7.34 10*3/MM3 (ref 4.5–10.7)

## 2018-03-15 NOTE — TELEPHONE ENCOUNTER
----- Message from Wilber Han MD sent at 3/15/2018  7:37 AM EDT -----  LAW - Let her know that her labs look stable.  Her iron is slightly low.  I recommend she take a daily multivitamin for now.

## 2018-07-16 ENCOUNTER — CONVERSION ENCOUNTER (OUTPATIENT)
Dept: SURGERY | Facility: CLINIC | Age: 38
End: 2018-07-16

## 2018-07-16 ENCOUNTER — OFFICE VISIT CONVERTED (OUTPATIENT)
Dept: SURGERY | Facility: CLINIC | Age: 38
End: 2018-07-16
Attending: SURGERY

## 2018-09-10 ENCOUNTER — PROCEDURE VISIT (OUTPATIENT)
Dept: OBSTETRICS AND GYNECOLOGY | Facility: CLINIC | Age: 38
End: 2018-09-10

## 2018-09-10 ENCOUNTER — OFFICE VISIT (OUTPATIENT)
Dept: OBSTETRICS AND GYNECOLOGY | Facility: CLINIC | Age: 38
End: 2018-09-10

## 2018-09-10 ENCOUNTER — APPOINTMENT (OUTPATIENT)
Dept: WOMENS IMAGING | Facility: HOSPITAL | Age: 38
End: 2018-09-10

## 2018-09-10 VITALS
WEIGHT: 165 LBS | SYSTOLIC BLOOD PRESSURE: 122 MMHG | DIASTOLIC BLOOD PRESSURE: 80 MMHG | BODY MASS INDEX: 30.36 KG/M2 | HEIGHT: 62 IN

## 2018-09-10 DIAGNOSIS — Z01.419 VISIT FOR GYNECOLOGIC EXAMINATION: Primary | ICD-10-CM

## 2018-09-10 DIAGNOSIS — Z12.31 VISIT FOR SCREENING MAMMOGRAM: Primary | ICD-10-CM

## 2018-09-10 DIAGNOSIS — Z30.40 ENCOUNTER FOR SURVEILLANCE OF CONTRACEPTIVES, UNSPECIFIED CONTRACEPTIVE: ICD-10-CM

## 2018-09-10 DIAGNOSIS — N94.6 DYSMENORRHEA: ICD-10-CM

## 2018-09-10 DIAGNOSIS — N92.0 MENORRHAGIA WITH REGULAR CYCLE: ICD-10-CM

## 2018-09-10 PROCEDURE — 99396 PREV VISIT EST AGE 40-64: CPT | Performed by: OBSTETRICS & GYNECOLOGY

## 2018-09-10 PROCEDURE — 77067 SCR MAMMO BI INCL CAD: CPT | Performed by: RADIOLOGY

## 2018-09-10 PROCEDURE — 77067 SCR MAMMO BI INCL CAD: CPT | Performed by: OBSTETRICS & GYNECOLOGY

## 2018-09-10 RX ORDER — ETONOGESTREL AND ETHINYL ESTRADIOL 11.7; 2.7 MG/1; MG/1
1 INSERT, EXTENDED RELEASE VAGINAL
Qty: 1 EACH | Refills: 11 | Status: SHIPPED | OUTPATIENT
Start: 2018-09-10 | End: 2019-07-10 | Stop reason: SDUPTHER

## 2018-09-10 RX ORDER — CYANOCOBALAMIN (VITAMIN B-12) 1000 MCG
TABLET, SUBLINGUAL SUBLINGUAL
COMMUNITY
End: 2022-01-28

## 2018-09-10 NOTE — PROGRESS NOTES
[facility]  [address], [address]  [place], [place] [address]  Phone: [phone] / Fax:  [phone]      09/10/2018    [address] [place] [address]    Summer Vasquez APRN    Chief Complaint   Patient presents with   • Gynecologic Exam     Annual Exam, last pap 7-26-16 nl hpv (-), mammogram today.       Iris Dailey is here for annual gynecologic exam.  HPI - Patient with regular cycles but reports them as predominately heavy despite contraception.  In addition, she has cramping that is moderate to severe, especially on the right side of the pelvis, with each cycle.  Patient has not had a work up for this issue. She uses Nuva Ring for contraception.  Patient did mammogram today.    Past Medical History:   Diagnosis Date   • Abnormal Pap smear of cervix    • Anxiety    • History of MRSA infection     LEGS, 7-8 YEARS AGO, URGENT CARE IN [place]   • Hypothyroid    • Kidney stone 07-07-07   • Left breast mass    • PONV (postoperative nausea and vomiting)        Past Surgical History:   Procedure Laterality Date   • APPENDECTOMY     • BREAST BIOPSY Left 2002    Benign   • BREAST BIOPSY Left 9/11/2017    Procedure: EXCISIONAL BIOPSY OF LEFT BREAST MASS WITH NEEDLE LOCALIZATION;  Surgeon: Malcolm Shannon MD;  [place] [facility] [place] [facility] [place]  Service:    • DILATATION AND CURETTAGE     • KNEE ARTHROSCOPY Right    • SHOULDER SURGERY Left 07/26/2018    lypoma       Allergies   Allergen Reactions   • Codeine Hives and Nausea And Vomiting       Social History     Social History   • Marital status: Single     Spouse name: N/A   • Number of children: N/A   • Years of education: N/A     Occupational History   • Not on file.     Social History Main Topics   • Smoking status: Never Smoker   • Smokeless tobacco: Never Used   • Alcohol use No   • Drug use: No   • Sexual activity: Yes     Partners: Male     Birth control/ protection: Other      Comment: NuVA ring     Other Topics Concern   • Not on file  "    Social History Narrative   • No narrative on file       Family History   Problem Relation Age of Onset   • Hyperlipidemia Father    • Hyperlipidemia Mother    • Cervical cancer Mother    • Diabetes Mother    • Colon cancer Paternal Grandfather    • Heart disease Paternal Grandfather    • Colon cancer Paternal Grandmother    • Hypothyroidism Paternal Grandmother         hypothyroidism   • Heart disease Paternal Grandmother    • Lymphoma Maternal Grandmother    • Heart disease Maternal Grandfather    • Brain cancer Maternal Uncle    • Hyperthyroidism Maternal Uncle         hyerthyroidism/thyroid cancer   • Thyroid cancer Maternal Uncle    • Malig Hyperthermia Neg Hx        Patient's last menstrual period was 2018.    OB History      Para Term  AB Living    3 1 0 1 2 2    SAB TAB Ectopic Molar Multiple Live Births    2         1        Obstetric Comments    Took birth control 8-12 months.          Vitals:    09/10/18 0925   BP: 122/80   Weight: 74.8 kg (165 lb)   Height: 157.5 cm (62\")       Physical Exam   Constitutional: She appears well-developed and well-nourished.   Genitourinary: Vagina normal and uterus normal. Pelvic exam was performed with patient supine. There is no tenderness or lesion on the right labia. There is no tenderness or lesion on the left labia. Right adnexum does not display tenderness and does not display fullness. Left adnexum does not display tenderness and does not display fullness. Cervix does not exhibit motion tenderness or lesion.   HENT:   Right Ear: External ear normal.   Left Ear: External ear normal.   Nose: Nose normal.   Eyes: Conjunctivae are normal.   Neck: Normal range of motion. Neck supple. No thyromegaly present.   Cardiovascular: Normal rate, regular rhythm and normal heart sounds.    Pulmonary/Chest: Effort normal. She has no wheezes. She has no rales. Right breast exhibits no mass and no nipple discharge. Left breast exhibits no mass and no nipple " discharge.   Abdominal: Soft. There is no tenderness. There is no guarding.   Musculoskeletal: Normal range of motion. She exhibits no edema.   Neurological: She is alert. Coordination normal.   Skin: Skin is warm and dry.   Psychiatric: She has a normal mood and affect. Her behavior is normal. Judgment and thought content normal.   Vitals reviewed.      Iris was seen today for gynecologic exam.    Diagnoses and all orders for this visit:    Visit for gynecologic examination  -     etonogestrel-ethinyl estradiol (NUVARING) 0.12-0.015 MG/24HR vaginal ring; Insert 1 each into the vagina Every 28 (Twenty-Eight) Days. Insert vaginally  for 3 consecutive weeks, then remove for 1 week.  -     Discussed importance of regular screening and breast awareness.  Mammogram today.    Menorrhagia with regular cycle  -     CBC (No Diff)  -     Follicle Stimulating Hormone  -     TSH Rfx On Abnormal To Free T4  -     Sonogram for bleeding    Dysmenorrhea        -     Sonogram ordered.  Follow up after work up completed to discuss options.        Wilber Han MD

## 2018-09-11 LAB
ERYTHROCYTE [DISTWIDTH] IN BLOOD BY AUTOMATED COUNT: 13.1 % (ref 11.7–13)
FSH SERPL-ACNC: 5.4 MIU/ML
HCT VFR BLD AUTO: 43.7 % (ref 35.6–45.5)
HGB BLD-MCNC: 14.6 G/DL (ref 11.9–15.5)
MCH RBC QN AUTO: 30.5 PG (ref 26.9–32)
MCHC RBC AUTO-ENTMCNC: 33.4 G/DL (ref 32.4–36.3)
MCV RBC AUTO: 91.4 FL (ref 80.5–98.2)
PLATELET # BLD AUTO: 274 10*3/MM3 (ref 140–500)
RBC # BLD AUTO: 4.78 10*6/MM3 (ref 3.9–5.2)
TSH SERPL DL<=0.005 MIU/L-ACNC: 1.06 MIU/ML (ref 0.27–4.2)
WBC # BLD AUTO: 15.03 10*3/MM3 (ref 4.5–10.7)

## 2018-09-13 ENCOUNTER — TELEPHONE (OUTPATIENT)
Dept: OBSTETRICS AND GYNECOLOGY | Facility: CLINIC | Age: 38
End: 2018-09-13

## 2018-09-13 NOTE — TELEPHONE ENCOUNTER
Pt aware of results.9-13-18/lw  ----- Message from Wilber Han MD sent at 9/12/2018  5:14 PM EDT -----  LAW - Let her know that her labs look normal.  We will go into more detail after she follows up with sonogram.

## 2018-09-20 ENCOUNTER — TELEPHONE (OUTPATIENT)
Dept: OBSTETRICS AND GYNECOLOGY | Facility: CLINIC | Age: 38
End: 2018-09-20

## 2018-09-20 DIAGNOSIS — N63.20 LEFT BREAST MASS: Primary | ICD-10-CM

## 2018-09-20 NOTE — TELEPHONE ENCOUNTER
Noe    This patient is aware of a small left breast mass on recent testing.  I put in orders for her to have follow up at [facility].    Thanks    Guille

## 2018-09-20 NOTE — TELEPHONE ENCOUNTER
Noe    PT called and would like to have this scheduled with Dr. Malcolm Shannon at [facility] if possible. Please advise.     Pt callback: [phone]

## 2018-09-24 ENCOUNTER — HOSPITAL ENCOUNTER (OUTPATIENT)
Dept: ULTRASOUND IMAGING | Facility: HOSPITAL | Age: 38
Discharge: HOME | End: 2018-09-24
Attending: OBSTETRICS & GYNECOLOGY | Admitting: OBSTETRICS & GYNECOLOGY

## 2018-09-24 ENCOUNTER — HOSPITAL ENCOUNTER (OUTPATIENT)
Dept: MAMMOGRAPHY | Facility: HOSPITAL | Age: 38
Discharge: HOME | End: 2018-09-24
Attending: OBSTETRICS & GYNECOLOGY

## 2018-09-24 DIAGNOSIS — N63.20 LEFT BREAST MASS: ICD-10-CM

## 2018-09-24 PROCEDURE — 77065 DX MAMMO INCL CAD UNI: CPT

## 2018-09-24 PROCEDURE — G0279 TOMOSYNTHESIS, MAMMO: HCPCS

## 2018-09-24 PROCEDURE — 76642 ULTRASOUND BREAST LIMITED: CPT

## 2018-09-26 ENCOUNTER — OFFICE VISIT (OUTPATIENT)
Dept: OBSTETRICS AND GYNECOLOGY | Facility: CLINIC | Age: 38
End: 2018-09-26

## 2018-09-26 ENCOUNTER — PROCEDURE VISIT (OUTPATIENT)
Dept: OBSTETRICS AND GYNECOLOGY | Facility: CLINIC | Age: 38
End: 2018-09-26

## 2018-09-26 VITALS — BODY MASS INDEX: 30 KG/M2 | WEIGHT: 164 LBS | SYSTOLIC BLOOD PRESSURE: 134 MMHG | DIASTOLIC BLOOD PRESSURE: 78 MMHG

## 2018-09-26 DIAGNOSIS — N92.0 MENORRHAGIA WITH REGULAR CYCLE: Primary | ICD-10-CM

## 2018-09-26 PROCEDURE — 99213 OFFICE O/P EST LOW 20 MIN: CPT | Performed by: OBSTETRICS & GYNECOLOGY

## 2018-09-26 PROCEDURE — 76830 TRANSVAGINAL US NON-OB: CPT | Performed by: OBSTETRICS & GYNECOLOGY

## 2018-09-26 RX ORDER — MULTIVIT/IRON SULF/FOLIC ACID 15MG-0.4MG
TABLET ORAL
COMMUNITY
End: 2021-08-15

## 2018-09-26 NOTE — PROGRESS NOTES
Subjective   Iris Dailey is a 38 y.o. female.     Cc:  Heavy cycles    History of Present Illness - 38 year old multiparous patient with intermittently heavy cycle, despite use of NuvaRing.  Patient cannot tolerate other contraceptives well due to side effects.  She underwent work up including CBC, TSH, FSH and sonogram.  There were no notable abnormalities.  Patient states that issues are manageable but she was looking for an etiology for her bleeding.    The following portions of the patient's history were reviewed and updated as appropriate:   She  has a past medical history of Abnormal Pap smear of cervix; Anxiety; History of MRSA infection; Hypothyroid; Kidney stone (07-07-07); Left breast mass; and PONV (postoperative nausea and vomiting).  She  reports that she has never smoked. She has never used smokeless tobacco. She reports that she does not drink alcohol or use drugs.  Current Outpatient Prescriptions on File Prior to Visit   Medication Sig   • Cyanocobalamin (VITAMIN B-12) 500 MCG sublingual tablet Place  under the tongue.   • etonogestrel-ethinyl estradiol (NUVARING) 0.12-0.015 MG/24HR vaginal ring Insert 1 each into the vagina Every 28 (Twenty-Eight) Days. Insert vaginally  for 3 consecutive weeks, then remove for 1 week.   • SYNTHROID 88 MCG tablet Take 88 mcg by mouth Daily.   • [DISCONTINUED] ibuprofen (ADVIL,MOTRIN) 600 MG tablet TK 1 T PO Q 6 H WF OR MILK PRF PAIN     No current facility-administered medications on file prior to visit.      She is allergic to codeine..    Review of Systems   Genitourinary: Positive for menstrual problem and vaginal bleeding.       Objective   Physical Exam   Constitutional: She appears well-nourished.   Psychiatric: She has a normal mood and affect. Her behavior is normal. Judgment and thought content normal.   Vitals reviewed.      Assessment/Plan   Iris was seen today for gynecologic exam.    Diagnoses and all orders for this visit:    Menorrhagia with  regular cycle  -  Given normal tests, I would lean toward conservative measures.  Discussed IUD placement but patient declines.  Discussed briefly ablation or hysterectomy, as medical therapy is being used and there is still cycle issues; however, patient is not interested in proceeding with surgical interventions at this time.  Unless bleeding worsens, patient should follow up next year at time of annual exam.    Wilber Han MD    I spent 15 minutes with patient and nearly the entire 15 minutes in counseling face to face.

## 2019-02-19 ENCOUNTER — CONVERSION ENCOUNTER (OUTPATIENT)
Dept: ORTHOPEDIC SURGERY | Facility: CLINIC | Age: 39
End: 2019-02-19

## 2019-02-19 ENCOUNTER — OFFICE VISIT CONVERTED (OUTPATIENT)
Dept: ORTHOPEDIC SURGERY | Facility: CLINIC | Age: 39
End: 2019-02-19
Attending: ORTHOPAEDIC SURGERY

## 2019-04-11 ENCOUNTER — OFFICE VISIT CONVERTED (OUTPATIENT)
Dept: ORTHOPEDIC SURGERY | Facility: CLINIC | Age: 39
End: 2019-04-11
Attending: ORTHOPAEDIC SURGERY

## 2019-07-10 DIAGNOSIS — Z01.419 VISIT FOR GYNECOLOGIC EXAMINATION: ICD-10-CM

## 2019-07-12 RX ORDER — ETONOGESTREL AND ETHINYL ESTRADIOL 11.7; 2.7 MG/1; MG/1
1 INSERT, EXTENDED RELEASE VAGINAL
Qty: 1 EACH | Refills: 11 | Status: SHIPPED | OUTPATIENT
Start: 2019-07-12 | End: 2019-10-14 | Stop reason: SDUPTHER

## 2019-09-27 ENCOUNTER — CONVERSION ENCOUNTER (OUTPATIENT)
Dept: ORTHOPEDIC SURGERY | Facility: CLINIC | Age: 39
End: 2019-09-27

## 2019-09-27 ENCOUNTER — OFFICE VISIT CONVERTED (OUTPATIENT)
Dept: ORTHOPEDIC SURGERY | Facility: CLINIC | Age: 39
End: 2019-09-27
Attending: ORTHOPAEDIC SURGERY

## 2019-10-14 ENCOUNTER — PROCEDURE VISIT (OUTPATIENT)
Dept: OBSTETRICS AND GYNECOLOGY | Facility: CLINIC | Age: 39
End: 2019-10-14

## 2019-10-14 ENCOUNTER — OFFICE VISIT (OUTPATIENT)
Dept: OBSTETRICS AND GYNECOLOGY | Facility: CLINIC | Age: 39
End: 2019-10-14

## 2019-10-14 ENCOUNTER — APPOINTMENT (OUTPATIENT)
Dept: WOMENS IMAGING | Facility: HOSPITAL | Age: 39
End: 2019-10-14

## 2019-10-14 VITALS
HEART RATE: 86 BPM | DIASTOLIC BLOOD PRESSURE: 86 MMHG | WEIGHT: 161 LBS | SYSTOLIC BLOOD PRESSURE: 127 MMHG | HEIGHT: 63 IN | BODY MASS INDEX: 28.53 KG/M2

## 2019-10-14 DIAGNOSIS — Z01.419 VISIT FOR GYNECOLOGIC EXAMINATION: ICD-10-CM

## 2019-10-14 DIAGNOSIS — Z12.31 VISIT FOR SCREENING MAMMOGRAM: Primary | ICD-10-CM

## 2019-10-14 DIAGNOSIS — Z01.419 PAP TEST, AS PART OF ROUTINE GYNECOLOGICAL EXAMINATION: Primary | ICD-10-CM

## 2019-10-14 PROBLEM — D17.22 LIPOMA OF LEFT SHOULDER: Status: ACTIVE | Noted: 2018-08-01

## 2019-10-14 PROCEDURE — 77063 BREAST TOMOSYNTHESIS BI: CPT | Performed by: OBSTETRICS & GYNECOLOGY

## 2019-10-14 PROCEDURE — 99396 PREV VISIT EST AGE 40-64: CPT | Performed by: OBSTETRICS & GYNECOLOGY

## 2019-10-14 PROCEDURE — 77067 SCR MAMMO BI INCL CAD: CPT | Performed by: OBSTETRICS & GYNECOLOGY

## 2019-10-14 PROCEDURE — 77067 SCR MAMMO BI INCL CAD: CPT | Performed by: RADIOLOGY

## 2019-10-14 PROCEDURE — 77063 BREAST TOMOSYNTHESIS BI: CPT | Performed by: RADIOLOGY

## 2019-10-14 RX ORDER — ETONOGESTREL AND ETHINYL ESTRADIOL 11.7; 2.7 MG/1; MG/1
1 INSERT, EXTENDED RELEASE VAGINAL
Qty: 1 EACH | Refills: 11 | Status: SHIPPED | OUTPATIENT
Start: 2019-10-14 | End: 2020-10-19

## 2019-10-14 RX ORDER — VENLAFAXINE HYDROCHLORIDE 37.5 MG/1
37.5 CAPSULE, EXTENDED RELEASE ORAL EVERY MORNING
COMMUNITY
Start: 2019-10-02 | End: 2022-12-19 | Stop reason: SDUPTHER

## 2019-10-14 RX ORDER — BUSPIRONE HYDROCHLORIDE 10 MG/1
10 TABLET ORAL 3 TIMES DAILY
COMMUNITY
End: 2020-11-02

## 2019-10-14 NOTE — PROGRESS NOTES
[facility]  [address], [address]  [place], [place] [address]  Phone: [phone] / Fax:  [phone]      10/14/2019    [address] [place] [id number]    Summer Vasquez APRN    Chief Complaint   Patient presents with   • Annual Exam     here for annual exam needs pap       Iris Dailey is here for annual gynecologic exam.  HPI - Patient on NuvaRing.  Cycles are improved but last month was heavier than normal.  Patient believes cycle issues are related to thyroid disorder.  She has been undergoing thyroid medication adjustment every 2 to 3 months.  Mammogram was completed today.    Past Medical History:   Diagnosis Date   • Abnormal Pap smear of cervix    • Anxiety    • History of MRSA infection     LEGS, 7-8 YEARS AGO, URGENT CARE IN [place]   • Hypothyroid    • Kidney stone 07-07-07   • Left breast mass    • PONV (postoperative nausea and vomiting)        Past Surgical History:   Procedure Laterality Date   • APPENDECTOMY     • BREAST BIOPSY Left 2002    Benign   • BREAST BIOPSY Left 9/11/2017    Procedure: EXCISIONAL BIOPSY OF LEFT BREAST MASS WITH NEEDLE LOCALIZATION;  Surgeon: Malcolm Shannon MD;  Location: [facility] [place];  Service:    • DILATATION AND CURETTAGE     • KNEE ARTHROSCOPY Right    • SHOULDER SURGERY Left 07/26/2018    lypoma       Allergies   Allergen Reactions   • Codeine Hives and Nausea And Vomiting       Social History     Socioeconomic History   • Marital status: Single     Spouse name: Not on file   • Number of children: Not on file   • Years of education: Not on file   • Highest education level: Not on file   Tobacco Use   • Smoking status: Never Smoker   • Smokeless tobacco: Never Used   Substance and Sexual Activity   • Alcohol use: No   • Drug use: No   • Sexual activity: Yes     Partners: Male     Birth control/protection: Ring     Comment: NuVA ring       Family History   Problem Relation Age of Onset   • Hyperlipidemia Father    • Hyperlipidemia  "Mother    • Cervical cancer Mother    • Diabetes Mother    • Colon cancer Paternal Grandfather    • Heart disease Paternal Grandfather    • Colon cancer Paternal Grandmother    • Hypothyroidism Paternal Grandmother         hypothyroidism   • Heart disease Paternal Grandmother    • Lymphoma Maternal Grandmother    • Heart disease Maternal Grandfather    • Brain cancer Maternal Uncle    • Hyperthyroidism Maternal Uncle         hyerthyroidism/thyroid cancer   • Thyroid cancer Maternal Uncle    • Malig Hyperthermia Neg Hx        Patient's last menstrual period was 2019.    OB History      Para Term  AB Living    3 1 0 1 2 1    SAB TAB Ectopic Molar Multiple Live Births    2         1        Obstetric Comments    Took birth control 8-12 months.          Vitals:    10/14/19 1326   BP: 127/86   Pulse: 86   Weight: 73 kg (161 lb)   Height: 160 cm (63\")       Physical Exam   Constitutional: She appears well-developed and well-nourished.   Genitourinary: Vagina normal and uterus normal. Pelvic exam was performed with patient supine. There is no tenderness or lesion on the right labia. There is no tenderness or lesion on the left labia. Right adnexum does not display tenderness and does not display fullness. Left adnexum does not display tenderness and does not display fullness. Cervix does not exhibit motion tenderness or lesion.   HENT:   Right Ear: External ear normal.   Left Ear: External ear normal.   Nose: Nose normal.   Eyes: Conjunctivae are normal.   Neck: Normal range of motion. Neck supple.   Cardiovascular: Normal rate, regular rhythm and normal heart sounds.   Pulmonary/Chest: Effort normal. No stridor. She has no wheezes. Right breast exhibits no mass and no nipple discharge. Left breast exhibits no mass and no nipple discharge.   Abdominal: Soft. There is no tenderness. There is no guarding.   Musculoskeletal: Normal range of motion. She exhibits no edema.   Neurological: She is alert. " Coordination normal.   Skin: Skin is warm and dry.   Psychiatric: She has a normal mood and affect. Her behavior is normal. Judgment and thought content normal.   Vitals reviewed.      Iris was seen today for annual exam.    Diagnoses and all orders for this visit:      Visit for gynecologic examination  -     IGP, Apt HPV,rfx 16 / 18,45  -     etonogestrel-ethinyl estradiol (NUVARING) 0.12-0.015 MG/24HR vaginal ring; Insert 1 each into the vagina Every 28 (Twenty-Eight) Days. Insert vaginally  for 3 consecutive weeks, then remove for 1 week.  -      Discussed importance of routine screening and breast awareness.  Mammogram today.        Wilber Han MD

## 2019-10-16 LAB
CYTOLOGIST CVX/VAG CYTO: NORMAL
CYTOLOGY CVX/VAG DOC CYTO: NORMAL
CYTOLOGY CVX/VAG DOC THIN PREP: NORMAL
DX ICD CODE: NORMAL
HIV 1 & 2 AB SER-IMP: NORMAL
HPV I/H RISK 4 DNA CVX QL PROBE+SIG AMP: NEGATIVE
OTHER STN SPEC: NORMAL
STAT OF ADQ CVX/VAG CYTO-IMP: NORMAL

## 2019-10-17 ENCOUNTER — TELEPHONE (OUTPATIENT)
Dept: OBSTETRICS AND GYNECOLOGY | Facility: CLINIC | Age: 39
End: 2019-10-17

## 2019-10-17 NOTE — TELEPHONE ENCOUNTER
Patient aware of results. 10-17-19/lw   ----- Message from Wilber Han MD sent at 10/17/2019  1:34 PM EDT -----  LAW - Let her know that her pap was normal.

## 2020-01-06 ENCOUNTER — HOSPITAL ENCOUNTER (OUTPATIENT)
Dept: URGENT CARE | Facility: CLINIC | Age: 40
Discharge: HOME | End: 2020-01-06

## 2020-06-04 ENCOUNTER — OFFICE VISIT CONVERTED (OUTPATIENT)
Dept: ORTHOPEDIC SURGERY | Facility: CLINIC | Age: 40
End: 2020-06-04
Attending: PHYSICIAN ASSISTANT

## 2020-10-01 ENCOUNTER — OFFICE VISIT CONVERTED (OUTPATIENT)
Dept: ORTHOPEDIC SURGERY | Facility: CLINIC | Age: 40
End: 2020-10-01
Attending: ORTHOPAEDIC SURGERY

## 2020-10-17 DIAGNOSIS — Z01.419 VISIT FOR GYNECOLOGIC EXAMINATION: ICD-10-CM

## 2020-10-19 RX ORDER — ETONOGESTREL AND ETHINYL ESTRADIOL 11.7; 2.7 MG/1; MG/1
INSERT, EXTENDED RELEASE VAGINAL
Qty: 1 EACH | Refills: 1 | Status: SHIPPED | OUTPATIENT
Start: 2020-10-19 | End: 2020-11-02 | Stop reason: SDUPTHER

## 2020-10-28 ENCOUNTER — HOSPITAL ENCOUNTER (OUTPATIENT)
Dept: GENERAL RADIOLOGY | Facility: HOSPITAL | Age: 40
Discharge: HOME | End: 2020-10-28
Attending: NURSE PRACTITIONER

## 2020-10-29 ENCOUNTER — CONVERSION ENCOUNTER (OUTPATIENT)
Dept: GASTROENTEROLOGY | Facility: CLINIC | Age: 40
End: 2020-10-29
Attending: INTERNAL MEDICINE

## 2020-11-02 ENCOUNTER — APPOINTMENT (OUTPATIENT)
Dept: WOMENS IMAGING | Facility: HOSPITAL | Age: 40
End: 2020-11-02

## 2020-11-02 ENCOUNTER — OFFICE VISIT (OUTPATIENT)
Dept: OBSTETRICS AND GYNECOLOGY | Facility: CLINIC | Age: 40
End: 2020-11-02

## 2020-11-02 ENCOUNTER — PROCEDURE VISIT (OUTPATIENT)
Dept: OBSTETRICS AND GYNECOLOGY | Facility: CLINIC | Age: 40
End: 2020-11-02

## 2020-11-02 VITALS
BODY MASS INDEX: 31.01 KG/M2 | HEIGHT: 63 IN | DIASTOLIC BLOOD PRESSURE: 72 MMHG | WEIGHT: 175 LBS | SYSTOLIC BLOOD PRESSURE: 112 MMHG

## 2020-11-02 DIAGNOSIS — Z01.419 VISIT FOR GYNECOLOGIC EXAMINATION: Primary | ICD-10-CM

## 2020-11-02 DIAGNOSIS — Z12.31 VISIT FOR SCREENING MAMMOGRAM: Primary | ICD-10-CM

## 2020-11-02 PROCEDURE — 77063 BREAST TOMOSYNTHESIS BI: CPT | Performed by: OBSTETRICS & GYNECOLOGY

## 2020-11-02 PROCEDURE — 99396 PREV VISIT EST AGE 40-64: CPT | Performed by: OBSTETRICS & GYNECOLOGY

## 2020-11-02 PROCEDURE — 77067 SCR MAMMO BI INCL CAD: CPT | Performed by: OBSTETRICS & GYNECOLOGY

## 2020-11-02 PROCEDURE — 77067 SCR MAMMO BI INCL CAD: CPT | Performed by: RADIOLOGY

## 2020-11-02 PROCEDURE — 77063 BREAST TOMOSYNTHESIS BI: CPT | Performed by: RADIOLOGY

## 2020-11-02 RX ORDER — LEVOTHYROXINE SODIUM 0.07 MG/1
75 TABLET ORAL DAILY
COMMUNITY
Start: 2020-08-12 | End: 2021-08-15

## 2020-11-02 RX ORDER — SODIUM, POTASSIUM,MAG SULFATES 17.5-3.13G
SOLUTION, RECONSTITUTED, ORAL ORAL
COMMUNITY
Start: 2020-10-29 | End: 2021-08-15

## 2020-11-02 RX ORDER — ETONOGESTREL AND ETHINYL ESTRADIOL 11.7; 2.7 MG/1; MG/1
1 INSERT, EXTENDED RELEASE VAGINAL
Qty: 1 EACH | Refills: 11 | Status: SHIPPED | OUTPATIENT
Start: 2020-11-02 | End: 2021-08-17

## 2020-11-02 NOTE — PROGRESS NOTES
[facility]  [address], [address]  [place], [place] [address]  Phone: [phone] / Fax:  [phone]      11/02/2020    [address] [place] [address]    Summer Vasquez APRN    Chief Complaint   Patient presents with   • Gynecologic Exam     Annual Exam, last pap 10-14-19 NL HPV (-). Mammogram today.       Iris Dailey is here for annual gynecologic exam.  HPI - Patient on NuvaRing for contraception; she has light bleeding once per month.  Pap was normal one year ago.  Mammogram was normal last year and repeated today.    Past Medical History:   Diagnosis Date   • Abnormal Pap smear of cervix    • Anxiety    • History of MRSA infection     LEGS, 7-8 YEARS AGO, URGENT CARE IN [place]   • Hypothyroid    • Kidney stone 07-07-07   • Left breast mass    • PONV (postoperative nausea and vomiting)        Past Surgical History:   Procedure Laterality Date   • APPENDECTOMY     • BREAST BIOPSY Left 2002    Benign   • BREAST BIOPSY Left 9/11/2017    Procedure: EXCISIONAL BIOPSY OF LEFT BREAST MASS WITH NEEDLE LOCALIZATION;  Surgeon: Malcolm Shannon MD;  [place] [facility] [place] [facility] [place]  Service:    • DILATATION AND CURETTAGE     • KNEE ARTHROSCOPY Right    • SHOULDER SURGERY Left 07/26/2018    lypoma       Allergies   Allergen Reactions   • Codeine Hives and Nausea And Vomiting       Social History     Socioeconomic History   • Marital status: Single     Spouse name: Not on file   • Number of children: Not on file   • Years of education: Not on file   • Highest education level: Not on file   Tobacco Use   • Smoking status: Never Smoker   • Smokeless tobacco: Never Used   Substance and Sexual Activity   • Alcohol use: No   • Drug use: No   • Sexual activity: Not Currently     Partners: Male       Family History   Problem Relation Age of Onset   • Hyperlipidemia Father    • Hyperlipidemia Mother    • Cervical cancer Mother    • Diabetes Mother    • Colon cancer Paternal Grandfather    • Heart  "disease Paternal Grandfather    • Hypothyroidism Paternal Grandmother         hypothyroidism   • Heart disease Paternal Grandmother    • Lymphoma Maternal Grandmother    • Heart disease Maternal Grandfather    • Brain cancer Maternal Uncle    • Hyperthyroidism Maternal Uncle         hyerthyroidism/thyroid cancer   • Thyroid cancer Maternal Uncle    • Malig Hyperthermia Neg Hx        Patient's last menstrual period was 10/09/2020 (exact date).    OB History        3    Para   1    Term   0       1    AB   2    Living   1       SAB   2    TAB        Ectopic        Molar        Multiple        Live Births   1          Obstetric Comments   Took birth control 8-12 months.             Vitals:    20 1107   BP: 112/72   Weight: 79.4 kg (175 lb)   Height: 160 cm (63\")       Physical Exam  Constitutional:       Appearance: Normal appearance. She is well-developed.   Genitourinary:      Pelvic exam was performed with patient in the lithotomy position.      Urethra, bladder, vagina and uterus normal.      No cervical motion tenderness or lesion.      Right adnexa not tender or full.      Left adnexa not tender or full.   HENT:      Right Ear: External ear normal.      Left Ear: External ear normal.      Nose: Nose normal.   Eyes:      Conjunctiva/sclera: Conjunctivae normal.   Neck:      Musculoskeletal: Normal range of motion and neck supple.      Thyroid: No thyromegaly.   Cardiovascular:      Rate and Rhythm: Normal rate and regular rhythm.      Heart sounds: Normal heart sounds.   Pulmonary:      Effort: Pulmonary effort is normal.      Breath sounds: No stridor. No wheezing.   Chest:      Breasts:         Right: No mass or nipple discharge.         Left: No mass or nipple discharge.   Abdominal:      Palpations: Abdomen is soft. There is no mass.      Tenderness: There is no guarding or rebound.   Musculoskeletal: Normal range of motion.   Neurological:      Mental Status: She is alert.      " Coordination: Coordination normal.   Skin:     General: Skin is warm and dry.   Psychiatric:         Mood and Affect: Mood normal.         Behavior: Behavior normal.         Thought Content: Thought content normal.         Judgment: Judgment normal.   Vitals signs reviewed.         Diagnoses and all orders for this visit:    1. Visit for gynecologic examination (Primary)  -     etonogestrel-ethinyl estradiol (NUVARING) 0.12-0.015 MG/24HR vaginal ring; Insert 1 each into the vagina Every 28 (Twenty-Eight) Days. Insert vaginally and leave in place for 3 weeks and remove 1 week  Dispense: 1 each; Refill: 11  -      Discussed importance of regular screening and breast awareness.        Wilber Han MD

## 2021-01-05 ENCOUNTER — HOSPITAL ENCOUNTER (OUTPATIENT)
Dept: GASTROENTEROLOGY | Facility: HOSPITAL | Age: 41
Setting detail: HOSPITAL OUTPATIENT SURGERY
Discharge: HOME | End: 2021-01-05
Attending: INTERNAL MEDICINE

## 2021-01-05 LAB — HCG UR QL: NEGATIVE

## 2021-01-19 ENCOUNTER — HOSPITAL ENCOUNTER (OUTPATIENT)
Dept: NUCLEAR MEDICINE | Facility: HOSPITAL | Age: 41
Discharge: HOME | End: 2021-01-19
Attending: NURSE PRACTITIONER

## 2021-03-08 ENCOUNTER — HOSPITAL ENCOUNTER (OUTPATIENT)
Dept: CT IMAGING | Facility: HOSPITAL | Age: 41
Discharge: HOME | End: 2021-03-08
Attending: OPTOMETRIST

## 2021-03-10 LAB — CONV THYROID STIMULATING IMMUNOGLOBULINS: 84.3 IU/L (ref 0–0.55)

## 2021-05-10 NOTE — H&P
History and Physical      Patient Name: Iris Dailey   Patient ID: [id number]   Sex: Female   YOB: 1980        Create Date: October 29, 2020              Chief Complaint  · Surgical History and Physical  · Screening Colonoscopy  · Direct access/screening phone call was made to the patient to update health history and schedule endoscopy      History Of Present Illness  NON-INPATIENT HISTORY AND PHYSICAL  Allergies: Codiene   Chief Complaint/History of Present Illness: Screening Colonoscopy   Colon Recall: No   Failed Outpatient Treatment/Contraindications: N/A   Current Medications: Effexor XR 37.5 mg oral capsule,extended release 24hr, NuvaRing 0.12-0.015 mg/24 hr vaginal ring, Suprep Bowel Prep Kit 17.5-3.13-1.6 gram oral recon soln, and Synthroid 88 mcg oral tablet   Significant Past Medical History: Breast lump, Hypothyroidism, Kidney stones, Nipple discharge, and Thyroid disorder   Significant Family Medical History: family history of colon cancer, family history of colon polyps, family history of lung cancer, family history of thyroid cancer   Significant Past Surgical History: Appendectomy, Breast, and Knee scope with removal of cartilage from knee joint   Previous Colonoscopy: No   Previous EGD: No   PHYSICAL EXAM:  Heart: Regular Rate and Rhythm   Lungs: Breathing Unlabored       Past Medical History  Disease Name Date Onset Notes   Breast lump --  --    Hypothyroidism --  --    Kidney stones --  --    Nipple discharge --  --    Thyroid disorder --  --          Past Surgical History  Procedure Name Date Notes   Appendectomy --  --    Breast --  --    Knee scope with removal of cartilage from knee joint --  --          Medication List  Name Date Started Instructions   Effexor XR 37.5 mg oral capsule,extended release 24hr  take 1 capsule (37.5 mg) by oral route once daily with food   NuvaRing 0.12-0.015 mg/24 hr vaginal ring  insert 1 vaginal ring by vaginal route once a month leave in place for 3  weeks, remove for 1 week   Suprep Bowel Prep Kit 17.5-3.13-1.6 gram oral recon soln 10/29/2020 please follow instructions per provider   Synthroid 88 mcg oral tablet  take 1 tablet (88 mcg) by oral route once daily         Allergy List  Allergen Name Date Reaction Notes   Codiene --  --  --          Family Medical History  Disease Name Relative/Age Notes   Diabetes, unspecified type Mother/   Mother  Mother  Mother; Uncle (maternal)  Mother   Renal Calculus Father/   Father   Family history of colon cancer Grandfather (paternal)/70s   Grandfather (paternal)/70s   Bladder calculus Father/   Father   Family history of lung cancer Uncle/   --    Family history of cervical cancer Mother/   --    Family history of certain chronic disabling diseases; arthritis Father/  Mother/   Mother; Father   Family history of Arthritis Father/  Mother/   Mother; Father  Father   Family history of thyroid cancer Uncle/   --    Family history of colonic polyps Father/  Mother/  Uncle/   --          Social History  Finding Status Start/Stop Quantity Notes   Alcohol Current some day --/-- --  drinks rarely   Alcohol Use Current some day --/-- --  rarely drinks   Caffeine Current some day --/-- --  drinks occasionally; soft drinks; 1-2 times per day   Claustophobic Unknown --/-- --  yes   lives with children --  --/-- --  --    Recreational Drug Use Never --/-- --  no   Second hand smoke exposure Never --/-- --  no   Single. --  --/-- --  --    Tobacco Never --/-- --  never smoker  never smoker  never a smoker  never smoker   Working --  --/-- --  --              Assessment  · Preoperative examination     V72.84/Z01.818  · Screening for colon cancer     V76.51/Z12.11    Problems Reconciled  Plan  · Orders  o Consent for Colonoscopy Screening -Possible risk/complications, benefits, and alternatives to surgical or invasive procedure have been explained to patient and/or legal gaurdian. -Patient has been evaluated and can tolerate  anethesia and/or sedation. Risk, benefits, and alternatives to anesthesia and sedation have been explained to patient or legal gaurdian. () - V72.84/Z01.818, V76.51/Z12.11 - 01/05/2021  · Medications  o Medications have been Reconciled  o Transition of Care or Provider Policy  · Instructions  o ****Surgical Orders****  o ***************  o Outpatient  o ***************  o RISK AND BENEFITS:  o Possible risks/complications, benefits and alternatives to surgical or invasive procedure have been explained to the patient and/or legal guardian.  o Patient has been evaluated and can tolerate anesthesia and/or sedation. Risks, benefits, and alternatives to anesthesia and sedation have been explained to the patient and/or legal guardian.  o ***************  o PREP: Per protocol  o IV: Per Anesthesia  o The above History and Physical Examination has been completed within 30 days of admission.  o This note has been transcribed by MACY Carrizales. I have read and agree with the findings in this note.            Electronically Signed by: Eun Carrizales MA -Author on October 29, 2020 02:34:29 PM

## 2021-05-13 NOTE — PROGRESS NOTES
Progress Note      Patient Name: Iris Dailey   Patient ID: [id number]   Sex: Female   YOB: 1980    Primary Care Provider: Summer MADISON   Referring Provider: Summer MADISON    Visit Date: June 4, 2020    Provider: Rossi Romo PA-C   Location: [facility]   Location Address: [address] [place] [address]  [id number]   Location Phone: [phone]          Chief Complaint  · Follow up right elbow pain      History Of Present Illness  Iris Dailey is a 40 year old /White female who presents today to [facility].      She is here for right elbow pain. She has history of lateral epicondylitis. She had an injection last September that relieved her pain. She requests injection today.       Past Medical History  Breast lump; Hypothyroidism; Kidney stones; Nipple discharge; Thyroid disorder         Past Surgical History  Appendectomy; Breast; Knee scope with removal of cartilage from knee joint         Medication List  buspirone 10 mg oral tablet; Effexor XR 37.5 mg oral capsule,extended release 24hr; NuvaRing 0.12-0.015 mg/24 hr vaginal ring; Synthroid 88 mcg oral tablet         Allergy List  Codiene       Allergies Reconciled  Family Medical History  Cancer, Unspecified; Diabetes, unspecified type; Renal Calculus; Family history of colon cancer; Bladder calculus; Family history of certain chronic disabling diseases; arthritis; Family history of Arthritis         Social History  Alcohol (Current some day); Alcohol Use (Current some day); Caffeine (Current some day); Claustophobic (Unknown); lives with children; Recreational Drug Use (Never); Second hand smoke exposure (Never); Single.; Tobacco (Never); Working         Review of Systems  · Constitutional  o Denies  o : fever, chills, weight loss  · Cardiovascular  o Denies  o : chest pain, shortness of breath  · Gastrointestinal  o Denies  o : liver disease, heartburn, nausea, blood in  "stools  · Genitourinary  o Denies  o : painful urination, blood in urine  · Integument  o Denies  o : rash, itching  · Neurologic  o Denies  o : headache, weakness, loss of consciousness  · Musculoskeletal  o Admits  o : painful, swollen joints  · Psychiatric  o Denies  o : drug/alcohol addiction, anxiety, depression      Vitals  Date Time BP Position Site L\R Cuff Size HR RR TEMP (F) WT  HT  BMI kg/m2 BSA m2 O2 Sat HC       06/04/2020 01:50 PM      75 - R   172lbs 0oz 5'  2\" 31.46 1.85 98 %          Physical Examination  · Constitutional  o Appearance  o : well developed, well-nourished, no obvious deformities present  · Head and Face  o Head  o :   § Inspection  § : normocephalic  o Face  o :   § Inspection  § : no facial lesions  · Eyes  o Conjunctivae  o : conjunctivae normal  o Sclerae  o : sclerae white  · Ears, Nose, Mouth and Throat  o Ears  o :   § External Ears  § : appearance within normal limits  § Hearing  § : intact  o Nose  o :   § External Nose  § : appearance normal  · Neck  o Inspection/Palpation  o : normal appearance  o Range of Motion  o : full range of motion  · Respiratory  o Respiratory Effort  o : breathing unlabored  o Inspection of Chest  o : normal appearance  o Auscultation of Lungs  o : no audible wheezing or rales  · Cardiovascular  o Heart  o : regular rate  · Gastrointestinal  o Abdominal Examination  o : soft and non-tender  · Skin and Subcutaneous Tissue  o General Inspection  o : intact, no rashes  · Psychiatric  o General  o : Alert and oriented x3  o Judgement and Insight  o : judgment and insight intact  o Mood and Affect  o : mood normal, affect appropriate  · Right Elbow  o Inspection  o : Full elbow range of motion. Tender lateral elbow. Non-tender medial elbow. Neurovascularly intact. Sensation grossly intact. Pulses normal.   · Injection Note/Aspiration Note  o Site  o : right elbow  o Procedure  o : Procedure: After educating the patient, patient gave consent for " procedure. After using Chloraprep, the joint space was injected. The patient tolerated the procedure well.   o Medication  o : 80 mg of DepoMedrol with 2cc of 1% Lidocaine          Assessment  · Lateral epicondylitis     726.32/M77.10  · Pain: Elbow     719.42/M25.529      Plan  · Orders  o Depo-Medrol injection 80mg () - 719.42/M25.529 - 06/04/2020   Lot [id number] manufactured by [employer] 05 2021  o Elbow-Lat Single Tendon (Injection) ([id number]) - 719.42/M25.529 - 06/04/2020   Lot [id number] manufactured by [employer] 07 2021 Administered by Rossi MCDOWELL  · Medications  o Medications have been Reconciled  o Transition of Care or Provider Policy  · Instructions  o Reviewed the patient's Past Medical, Social, and Family history as well as the ROS at today's visit, no changes.  o Call or return if worsening symptoms.  o Elbow injection today. Follow up PRN. Voltaren gel prescribed. Continue elbow strap.  o Electronically Identified Patient Education Materials Provided Electronically            Electronically Signed by: SHANAE MendezC -Author on June 4, 2020 02:23:28 PM

## 2021-05-14 ENCOUNTER — HOSPITAL ENCOUNTER (OUTPATIENT)
Dept: LAB | Facility: HOSPITAL | Age: 41
Discharge: HOME | End: 2021-05-14
Attending: OPTOMETRIST

## 2021-05-14 VITALS — HEART RATE: 78 BPM | BODY MASS INDEX: 31.28 KG/M2 | WEIGHT: 170 LBS | HEIGHT: 62 IN | OXYGEN SATURATION: 97 %

## 2021-05-15 VITALS — HEIGHT: 63 IN | HEART RATE: 85 BPM | WEIGHT: 169 LBS | OXYGEN SATURATION: 99 % | BODY MASS INDEX: 29.95 KG/M2

## 2021-05-15 VITALS — HEART RATE: 89 BPM | OXYGEN SATURATION: 99 % | BODY MASS INDEX: 29.49 KG/M2 | WEIGHT: 160.25 LBS | HEIGHT: 62 IN

## 2021-05-15 VITALS — OXYGEN SATURATION: 98 % | HEART RATE: 77 BPM | HEIGHT: 62 IN | BODY MASS INDEX: 31.65 KG/M2 | WEIGHT: 172 LBS

## 2021-05-16 VITALS — BODY MASS INDEX: 30.34 KG/M2 | HEIGHT: 63 IN | WEIGHT: 171.25 LBS | RESPIRATION RATE: 14 BRPM

## 2021-05-16 VITALS — HEIGHT: 63 IN | OXYGEN SATURATION: 100 % | WEIGHT: 165 LBS | HEART RATE: 86 BPM | BODY MASS INDEX: 29.23 KG/M2

## 2021-05-16 LAB — T3FREE SERPL-MCNC: 5.2 PG/ML (ref 2–4.4)

## 2021-08-15 ENCOUNTER — HOSPITAL ENCOUNTER (EMERGENCY)
Facility: HOSPITAL | Age: 41
Discharge: HOME | End: 2021-08-15
Admitting: EMERGENCY MEDICINE

## 2021-08-15 ENCOUNTER — APPOINTMENT (OUTPATIENT)
Dept: ULTRASOUND IMAGING | Facility: HOSPITAL | Age: 41
End: 2021-08-15

## 2021-08-15 VITALS
HEART RATE: 77 BPM | HEIGHT: 63 IN | TEMPERATURE: 98.8 F | BODY MASS INDEX: 32.58 KG/M2 | WEIGHT: 183.86 LBS | DIASTOLIC BLOOD PRESSURE: 76 MMHG | OXYGEN SATURATION: 98 % | SYSTOLIC BLOOD PRESSURE: 108 MMHG | RESPIRATION RATE: 18 BRPM

## 2021-08-15 DIAGNOSIS — N93.9 VAGINAL BLEEDING: Primary | ICD-10-CM

## 2021-08-15 LAB
ANION GAP SERPL CALCULATED.3IONS-SCNC: 10.9 MMOL/L (ref 5–15)
BASOPHILS # BLD AUTO: 0.04 10*3/MM3 (ref 0–0.2)
BASOPHILS NFR BLD AUTO: 0.5 % (ref 0–1.5)
BUN SERPL-MCNC: 11 MG/DL (ref 6–20)
BUN/CREAT SERPL: 12.1 (ref 7–25)
CALCIUM SPEC-SCNC: 9.5 MG/DL (ref 8.6–10.5)
CHLORIDE SERPL-SCNC: 106 MMOL/L (ref 98–107)
CO2 SERPL-SCNC: 23.1 MMOL/L (ref 22–29)
CREAT SERPL-MCNC: 0.91 MG/DL (ref 0.57–1)
DEPRECATED RDW RBC AUTO: 40.8 FL (ref 37–54)
EOSINOPHIL # BLD AUTO: 0.04 10*3/MM3 (ref 0–0.4)
EOSINOPHIL NFR BLD AUTO: 0.5 % (ref 0.3–6.2)
ERYTHROCYTE [DISTWIDTH] IN BLOOD BY AUTOMATED COUNT: 13 % (ref 12.3–15.4)
GFR SERPL CREATININE-BSD FRML MDRD: 67 ML/MIN/1.73
GLUCOSE SERPL-MCNC: 116 MG/DL (ref 65–99)
HCG INTACT+B SERPL-ACNC: <0.5 MIU/ML
HCT VFR BLD AUTO: 39.5 % (ref 34–46.6)
HGB BLD-MCNC: 13.3 G/DL (ref 12–15.9)
HOLD SPECIMEN: NORMAL
HOLD SPECIMEN: NORMAL
IMM GRANULOCYTES # BLD AUTO: 0.01 10*3/MM3 (ref 0–0.05)
IMM GRANULOCYTES NFR BLD AUTO: 0.1 % (ref 0–0.5)
LYMPHOCYTES # BLD AUTO: 3.48 10*3/MM3 (ref 0.7–3.1)
LYMPHOCYTES NFR BLD AUTO: 43.9 % (ref 19.6–45.3)
MCH RBC QN AUTO: 29 PG (ref 26.6–33)
MCHC RBC AUTO-ENTMCNC: 33.7 G/DL (ref 31.5–35.7)
MCV RBC AUTO: 86.2 FL (ref 79–97)
MONOCYTES # BLD AUTO: 0.28 10*3/MM3 (ref 0.1–0.9)
MONOCYTES NFR BLD AUTO: 3.5 % (ref 5–12)
NEUTROPHILS NFR BLD AUTO: 4.08 10*3/MM3 (ref 1.7–7)
NEUTROPHILS NFR BLD AUTO: 51.5 % (ref 42.7–76)
NRBC BLD AUTO-RTO: 0 /100 WBC (ref 0–0.2)
PLATELET # BLD AUTO: 201 10*3/MM3 (ref 140–450)
PMV BLD AUTO: 10.1 FL (ref 6–12)
POTASSIUM SERPL-SCNC: 5.3 MMOL/L (ref 3.5–5.2)
RBC # BLD AUTO: 4.58 10*6/MM3 (ref 3.77–5.28)
SODIUM SERPL-SCNC: 140 MMOL/L (ref 136–145)
WBC # BLD AUTO: 7.93 10*3/MM3 (ref 3.4–10.8)
WHOLE BLOOD HOLD SPECIMEN: NORMAL

## 2021-08-15 PROCEDURE — 84702 CHORIONIC GONADOTROPIN TEST: CPT | Performed by: NURSE PRACTITIONER

## 2021-08-15 PROCEDURE — 80048 BASIC METABOLIC PNL TOTAL CA: CPT | Performed by: NURSE PRACTITIONER

## 2021-08-15 PROCEDURE — 85025 COMPLETE CBC W/AUTO DIFF WBC: CPT

## 2021-08-15 PROCEDURE — 99283 EMERGENCY DEPT VISIT LOW MDM: CPT

## 2021-08-15 PROCEDURE — 76830 TRANSVAGINAL US NON-OB: CPT

## 2021-08-15 RX ORDER — SODIUM CHLORIDE 0.9 % (FLUSH) 0.9 %
10 SYRINGE (ML) INJECTION AS NEEDED
Status: DISCONTINUED | OUTPATIENT
Start: 2021-08-15 | End: 2021-08-15 | Stop reason: HOSPADM

## 2021-08-15 RX ORDER — METHIMAZOLE 10 MG/1
10 TABLET ORAL
COMMUNITY
End: 2021-12-16 | Stop reason: DRUGHIGH

## 2021-08-15 NOTE — DISCHARGE INSTRUCTIONS
Please continue follow-up with your GYN on Tuesday  Return to the ED for new worsening symptoms  Rest

## 2021-08-15 NOTE — ED PROVIDER NOTES
Subjective   Patient presents with:  Vaginal Bleeding    Summer Vasquez, APRN  Patient's last menstrual period was 08/08/2021.   -- Codeine -- Hives and Nausea And Vomiting  Onset: 7 days    Context:  Patient is a 41-year-old female presents emergency department complaint of vaginal bleeding.  Patient reports this began 7 days ago.  She reports she is a history of heavy menstrual cycles, she has a gynecologist at [facility], Dr. Han, she is supposed to be worked in for an appointment on Tuesday.  Patient reports she feels that her bleeding was heavier than normal.  She denies any chest pain or shortness of breath.  No episodes of dizziness, lightheadedness, diaphoresis or syncope.  No concern for STDs.  No fever or chills.          Review of Systems   Constitutional: Negative for chills, diaphoresis, fatigue and fever.   Respiratory: Negative for cough and chest tightness.    Cardiovascular: Negative for chest pain and palpitations.   Gastrointestinal: Negative for abdominal pain, diarrhea, nausea and vomiting.   Genitourinary: Positive for menstrual problem and vaginal bleeding. Negative for decreased urine volume, difficulty urinating, dysuria, enuresis, flank pain, frequency, genital sores, hematuria, pelvic pain, urgency, vaginal discharge and vaginal pain.   Musculoskeletal: Negative for back pain and neck pain.   Skin: Negative for color change and rash.   Neurological: Negative for dizziness, syncope and light-headedness.       Past Medical History:   Diagnosis Date   • Abnormal Pap smear of cervix    • Anxiety    • History of MRSA infection     LEGS, 7-8 YEARS AGO, URGENT CARE IN [place]   • Hypothyroid    • Kidney stone 07-07-07   • Left breast mass    • PONV (postoperative nausea and vomiting)        Allergies   Allergen Reactions   • Codeine Hives and Nausea And Vomiting       Past Surgical History:   Procedure Laterality Date   • APPENDECTOMY     • BREAST BIOPSY Left 2002    Benign    • BREAST BIOPSY Left 9/11/2017    Procedure: EXCISIONAL BIOPSY OF LEFT BREAST MASS WITH NEEDLE LOCALIZATION;  Surgeon: Malcolm Shannon MD;  [place] [facility] [place] [facility] [place]  Service:    • DILATATION AND CURETTAGE      patient states several due to heavy bleeding   • KNEE ARTHROSCOPY Right    • SHOULDER SURGERY Left 07/26/2018    lypoma       Family History   Problem Relation Age of Onset   • Hyperlipidemia Father    • Hyperlipidemia Mother    • Cervical cancer Mother    • Diabetes Mother    • Colon cancer Paternal Grandfather    • Heart disease Paternal Grandfather    • Hypothyroidism Paternal Grandmother         hypothyroidism   • Heart disease Paternal Grandmother    • Lymphoma Maternal Grandmother    • Heart disease Maternal Grandfather    • Brain cancer Maternal Uncle    • Hyperthyroidism Maternal Uncle         hyerthyroidism/thyroid cancer   • Thyroid cancer Maternal Uncle    • Malig Hyperthermia Neg Hx        Social History     Socioeconomic History   • Marital status: Single     Spouse name: Not on file   • Number of children: Not on file   • Years of education: Not on file   • Highest education level: Not on file   Tobacco Use   • Smoking status: Never Smoker   • Smokeless tobacco: Never Used   Substance and Sexual Activity   • Alcohol use: No   • Drug use: No   • Sexual activity: Not Currently     Partners: Male           Objective   Physical Exam  Vitals and nursing note reviewed.   Constitutional:       General: She is not in acute distress.     Appearance: Normal appearance. She is not ill-appearing, toxic-appearing or diaphoretic.   HENT:      Head: Normocephalic and atraumatic.      Nose: Nose normal.      Mouth/Throat:      Mouth: Mucous membranes are moist.      Pharynx: Oropharynx is clear.   Eyes:      Extraocular Movements: Extraocular movements intact.      Conjunctiva/sclera: Conjunctivae normal.      Pupils: Pupils are equal, round, and reactive to light.   Cardiovascular:      Rate and Rhythm:  "Normal rate and regular rhythm.      Heart sounds: Normal heart sounds. No murmur heard.   No friction rub. No gallop.    Pulmonary:      Effort: Pulmonary effort is normal.      Breath sounds: Normal breath sounds.   Abdominal:      General: Bowel sounds are normal.      Palpations: Abdomen is soft.      Tenderness: There is no abdominal tenderness. There is no guarding or rebound.   Genitourinary:     Comments: She was placed in the lithotomy position external genitalia were found to have no lesions or swelling.  Speculum exam shows closed cervix. Blood noted in vaginal vault, easily cleared with one swipe of swab, no severe hemorrhage or bleeding noted.  The patient had no cervical motion tenderness.  Patient had no adnexal tenderness.  The patient had cultures obtained and her exam was performed withTaylor,  tech at the bedside.  Musculoskeletal:         General: Normal range of motion.      Cervical back: Normal range of motion and neck supple.      Right lower leg: No edema.      Left lower leg: No edema.   Skin:     General: Skin is warm and dry.      Capillary Refill: Capillary refill takes less than 2 seconds.   Neurological:      General: No focal deficit present.      Mental Status: She is alert and oriented to person, place, and time.   Psychiatric:         Mood and Affect: Mood normal.         Behavior: Behavior normal.         Procedures           ED Course      /76   Pulse 77   Temp 98.8 °F (37.1 °C)   Resp 18   Ht 160 cm (63\")   Wt 83.4 kg (183 lb 13.8 oz)   LMP 08/08/2021   SpO2 98%   Breastfeeding No   BMI 32.57 kg/m²   Labs Reviewed   CBC WITH AUTO DIFFERENTIAL - Abnormal; Notable for the following components:       Result Value    Monocyte % 3.5 (*)     Lymphocytes, Absolute 3.48 (*)     All other components within normal limits   BASIC METABOLIC PANEL - Abnormal; Notable for the following components:    Glucose 116 (*)     Potassium 5.3 (*)     All other components within normal " limits    Narrative:     GFR Normal >60  Chronic Kidney Disease <60  Kidney Failure <15     RAINBOW DRAW    Narrative:     The following orders were created for panel order [place] Draw.  Procedure                               Abnormality         Status                     ---------                               -----------         ------                     Green Top (Gel)[[id number]]                                  Final result               Lavender Top[[id number]]                                     Final result               Gold Top - SST[[id number]]                                   Final result                 Please view results for these tests on the individual orders.   HCG, QUANTITATIVE, PREGNANCY    Narrative:     HCG Ranges by Gestational Age    Females - non-pregnant premenopausal   </= 1mIU/mL HCG  Females - postmenopausal               </= 7mIU/mL HCG    3 Weeks       5.4   -      72 mIU/mL  4 Weeks      10.2   -     708 mIU/mL  5 Weeks       217   -   8,245 mIU/mL  6 Weeks       152   -  32,177 mIU/mL  7 Weeks     4,059   - 153,767 mIU/mL  8 Weeks    31,366   - 149,094 mIU/mL  9 Weeks    59,109   - 135,901 mIU/mL  10 Weeks   44,186   - 170,409 mIU/mL  12 Weeks   27,107   - 201,615 mIU/mL  14 Weeks   24,302   -  93,646 mIU/mL  15 Weeks   12,540   -  69,747 mIU/mL  16 Weeks    8,904   -  55,332 mIU/mL  17 Weeks    8,240   -  51,793 mIU/mL  18 Weeks    9,649   -  55,271 mIU/mL    Results may be falsely decreased if patient taking Biotin.     CBC AND DIFFERENTIAL    Narrative:     The following orders were created for panel order CBC & Differential.  Procedure                               Abnormality         Status                     ---------                               -----------         ------                     CBC Auto Differential[[id number]]        Abnormal            Final result                 Please view results for these tests on the individual orders.   GREEN TOP   LAVENDER TOP   GOLD  TOP - SST     Medications - No data to display  US Non-ob Transvaginal    Result Date: 8/15/2021  Narrative: PROCEDURE: US NON-OB TRANSVAGINAL  COMPARISON: None  INDICATIONS: vaginal bleeding  TECHNIQUE: Ultrasound examination of the pelvis was performed, using endovaginal technique.   FINDINGS:  UTERUS: Size is 7.6 cm with unremarkable appearance.  Endometrial thickness is 5 mm.  ADNEXAE: Normal bilateral appearance with no significant masses.  Each ovary is normal in size for a patient of this age.  A probable involuting cyst is seen arising from the right ovary measuring up to 1.4 cm.  No adnexal mass identified on the left.  CUL-DE-SAC: Normal.  No fluid or mass.  OTHER: Heterogeneous hypoechoic signal changes are present within the cervix.   CONCLUSION: Heterogeneous hypoechoic signal changes present within the cervix which may represent blood products.  The endometrial thickness is 5 mm which is within normal limits.  No additional collections identified.  No myometrial lesions identified.  A probable involuting physiologic cyst is seen arising from the right ovary.     ELADIO OSORIO MD       Electronically Signed and Approved By: ELADIO OSORIO MD on 8/15/2021 at 11:12                                                  MDM  Number of Diagnoses or Management Options  Vaginal bleeding  Diagnosis management comments: Appropriate PPE was worn during the duration of the care for this patient while in the emergency department per [facility] Policy    ----Differentials: Uterine fibroids, ectopic pregnancy, dysfunctional uterine bleeding  This list is not all inclusive and does not constitute the entireity of considered causes.     ----Lab and imaging reviewd by me, imaging interpreted per radiologist and independly viewed by me: As above    ED  Course----Patient was brought back to the emergency department room for evaluation and placed on appropriate monitoring.      Patient has no severe bleeding on pelvic  exam.    Vital signs have  been reviewed. Patient is afebrile. Non toxic in appearance. Alert and oriented to person, place, time and situation.  Benign nontender abdominal exam.  Vital signs not concerning.  Patient has establish care with a gynecologist at [facility], she has an appointment and is being worked in on Tuesday.  This time I feel she can safely be discharged home follow-up with gynecology as well as primary care.    I spoke with the patient at the bedside regarding their plan of care, discharge instruction, home care, prescriptions, and importance follow-up.  We discussed test results at the bedside, including incidental abnormal labs, radiological findings, understands need for follow-up with primary care or specialist if indicated.      Patient was made aware of indications to return to the emergency department.  Patient agrees with the current plan of care for discharge, verbalized understanding of all instructions    Pt is aware that discharge does not mean that nothing is wrong but it indicates no emergency is present and they must continue care with follow-up as given below or physician of their choice                               Amount and/or Complexity of Data Reviewed  Clinical lab tests: reviewed  Tests in the radiology section of CPT®: reviewed    Patient Progress  Patient progress: stable      Final diagnoses:   Vaginal bleeding       ED Disposition  ED Disposition     ED Disposition Condition Comment    Discharge Stable           Summer Vasquez, APRN  [address] [facility] [address] [facility]  [address]  [place] [address]  [phone]    Schedule an appointment as soon as possible for a visit       PATIENT CONNECTION - [facility] [address]  [phone]  Schedule an appointment as soon as possible for a visit   Call for assistance with follow up with Primary care provider-call tomorrow.         Medication List      No changes were made to your prescriptions during this visit.           Olimpia Melendez, APRN  08/15/21 1557

## 2021-08-17 ENCOUNTER — TELEPHONE (OUTPATIENT)
Dept: OBSTETRICS AND GYNECOLOGY | Facility: CLINIC | Age: 41
End: 2021-08-17

## 2021-08-17 ENCOUNTER — OFFICE VISIT (OUTPATIENT)
Dept: OBSTETRICS AND GYNECOLOGY | Facility: CLINIC | Age: 41
End: 2021-08-17

## 2021-08-17 VITALS
WEIGHT: 180 LBS | SYSTOLIC BLOOD PRESSURE: 128 MMHG | DIASTOLIC BLOOD PRESSURE: 83 MMHG | BODY MASS INDEX: 31.89 KG/M2 | HEIGHT: 63 IN | HEART RATE: 96 BPM

## 2021-08-17 DIAGNOSIS — N93.8 DYSFUNCTIONAL UTERINE BLEEDING: Primary | ICD-10-CM

## 2021-08-17 PROCEDURE — 99213 OFFICE O/P EST LOW 20 MIN: CPT | Performed by: OBSTETRICS & GYNECOLOGY

## 2021-08-17 NOTE — TELEPHONE ENCOUNTER
Fernanda in [facility] called to clarify rx.  Asking: after BID, is pt to be on 1 tab per day until active tabs are finished?    Fernanda  [phone]

## 2021-08-18 NOTE — PROGRESS NOTES
Subjective   Iris Dailey is a 41 y.o. female.     Cc:  Heavy period    History of Present Illness - 41 year old pleasant female with recent onset of dysfunctional uterine bleeding.  Patient uses Nuva Ring for contraception and is compliant with birth control.  Patient had spotting and light bleeding that progressed to heavier bleeding.  Patient went to ED over the weekend.  Work up included a sonogram and labs that were essentially normal.  Patient reports bleeding had continued to be heavy and she arranged for follow up today.  Patient reports passage of clots and Nuva Ring fell out because bleeding progressed.  She has a history of thyroid abnormality but studies were recently normal.    The following portions of the patient's history were reviewed and updated as appropriate:   She  has a past medical history of Abnormal Pap smear of cervix, Anxiety, History of MRSA infection, Hypothyroid, Kidney stone (07-07-07), Left breast mass, and PONV (postoperative nausea and vomiting).  She  has a past surgical history that includes Appendectomy; Breast biopsy (Left, 2002); Knee arthroscopy (Right); Breast biopsy (Left, 9/11/2017); Shoulder surgery (Left, 07/26/2018); and Dilation and curettage of uterus.  Her family history includes Brain cancer in her maternal uncle; Cervical cancer in her mother; Colon cancer in her paternal grandfather; Diabetes in her mother; Heart disease in her maternal grandfather, paternal grandfather, and paternal grandmother; Hyperlipidemia in her father and mother; Hyperthyroidism in her maternal uncle; Hypothyroidism in her paternal grandmother; Lymphoma in her maternal grandmother; Thyroid cancer in her maternal uncle.  She  reports that she has never smoked. She has never used smokeless tobacco. She reports that she does not drink alcohol and does not use drugs.  Current Outpatient Medications   Medication Sig Dispense Refill   • Cyanocobalamin (VITAMIN B-12) 500 MCG sublingual tablet  Place  under the tongue.     • methIMAzole (TAPAZOLE) 10 MG tablet Take 10 mg by mouth.     • Teprotumumab-trbw (TEPEZZA IV) Infuse  into a venous catheter. Every 3 weeks     • venlafaxine XR (EFFEXOR-XR) 37.5 MG 24 hr capsule      • norethindrone-ethinyl estradiol (Ovcon-35, 28,) 0.4-35 MG-MCG per tablet Take 1 tablet by mouth Daily. To be performed as an OCP taper.  TID for 3 days, then BID for 2 days 28 tablet 0     No current facility-administered medications for this visit.     She is allergic to codeine..    Review of Systems   Constitutional: Negative for chills and fever.   Genitourinary: Positive for menstrual problem and vaginal bleeding.       Objective   Physical Exam  Vitals reviewed. Exam conducted with a chaperone present.   Constitutional:       Appearance: Normal appearance.   Abdominal:      Palpations: Abdomen is soft.      Tenderness: There is no guarding or rebound.   Genitourinary:     General: Normal vulva.      Exam position: Lithotomy position.      Labia:         Right: No rash or tenderness.         Left: No rash or tenderness.       Vagina: Normal.      Cervix: Normal.      Uterus: Normal.       Adnexa: Right adnexa normal and left adnexa normal.   Neurological:      Mental Status: She is alert.   Psychiatric:         Mood and Affect: Mood normal.         Behavior: Behavior normal.         Thought Content: Thought content normal.         Judgment: Judgment normal.         Assessment/Plan   Diagnoses and all orders for this visit:    1. Dysfunctional uterine bleeding (Primary)  -     norethindrone-ethinyl estradiol (Ovcon-35, 28,) 0.4-35 MG-MCG per tablet; Take 1 tablet by mouth Daily. To be performed as an OCP taper.  TID for 3 days, then BID for 2 days  Dispense: 28 tablet; Refill: 0  -     Records and exam reveal normal findings.  DUB is uncertain etiology.  Will attempt to alleviate bleeding with OCP taper.  If bleeding does not improve, consider hysteroscopy with D&C.  Patient to  notify me.    Discussed importance of Covid vaccination.    Wilber Han MD

## 2021-08-20 ENCOUNTER — TELEPHONE (OUTPATIENT)
Dept: OBSTETRICS AND GYNECOLOGY | Facility: CLINIC | Age: 41
End: 2021-08-20

## 2021-08-20 NOTE — TELEPHONE ENCOUNTER
Patient is still working on finishing pack of tapered birth control, still bleeding, not as heavily, with clotting.  She said you wanted her to let you know today what was going on, and if you have questions you can call her or send a Isoterat message.     Thanks, Cathie

## 2021-09-02 DIAGNOSIS — N93.8 DYSFUNCTIONAL UTERINE BLEEDING: ICD-10-CM

## 2021-09-02 RX ORDER — NORETHINDRONE AND ETHINYL ESTRADIOL 0.4-0.035
KIT ORAL
Qty: 28 TABLET | Refills: 2 | Status: SHIPPED | OUTPATIENT
Start: 2021-09-02 | End: 2021-10-25

## 2021-10-23 DIAGNOSIS — Z01.419 VISIT FOR GYNECOLOGIC EXAMINATION: ICD-10-CM

## 2021-10-25 RX ORDER — ETONOGESTREL AND ETHINYL ESTRADIOL 11.7; 2.7 MG/1; MG/1
INSERT, EXTENDED RELEASE VAGINAL
Qty: 1 EACH | Refills: 5 | Status: SHIPPED | OUTPATIENT
Start: 2021-10-25 | End: 2022-01-28

## 2021-11-12 ENCOUNTER — TRANSCRIBE ORDERS (OUTPATIENT)
Dept: ADMINISTRATIVE | Facility: HOSPITAL | Age: 41
End: 2021-11-12

## 2021-11-12 DIAGNOSIS — R10.11 RUQ PAIN: Primary | ICD-10-CM

## 2021-12-07 ENCOUNTER — HOSPITAL ENCOUNTER (OUTPATIENT)
Dept: NUCLEAR MEDICINE | Facility: HOSPITAL | Age: 41
Discharge: HOME | End: 2021-12-07

## 2021-12-07 ENCOUNTER — HOSPITAL ENCOUNTER (OUTPATIENT)
Dept: ULTRASOUND IMAGING | Facility: HOSPITAL | Age: 41
Discharge: HOME | End: 2021-12-07

## 2021-12-07 DIAGNOSIS — R10.11 RUQ PAIN: ICD-10-CM

## 2021-12-07 PROCEDURE — 76705 ECHO EXAM OF ABDOMEN: CPT

## 2021-12-07 PROCEDURE — 0 TECHNETIUM TETROFOSMIN KIT: Performed by: NURSE PRACTITIONER

## 2021-12-07 PROCEDURE — 78227 HEPATOBIL SYST IMAGE W/DRUG: CPT

## 2021-12-07 PROCEDURE — A9502 TC99M TETROFOSMIN: HCPCS | Performed by: NURSE PRACTITIONER

## 2021-12-07 RX ADMIN — TETROFOSMIN 1 DOSE: 1.38 INJECTION, POWDER, LYOPHILIZED, FOR SOLUTION INTRAVENOUS at 14:43

## 2021-12-16 ENCOUNTER — PROCEDURE VISIT (OUTPATIENT)
Dept: OBSTETRICS AND GYNECOLOGY | Facility: CLINIC | Age: 41
End: 2021-12-16

## 2021-12-16 ENCOUNTER — APPOINTMENT (OUTPATIENT)
Dept: WOMENS IMAGING | Facility: HOSPITAL | Age: 41
End: 2021-12-16

## 2021-12-16 ENCOUNTER — OFFICE VISIT (OUTPATIENT)
Dept: OBSTETRICS AND GYNECOLOGY | Facility: CLINIC | Age: 41
End: 2021-12-16

## 2021-12-16 VITALS
SYSTOLIC BLOOD PRESSURE: 151 MMHG | DIASTOLIC BLOOD PRESSURE: 97 MMHG | BODY MASS INDEX: 32.43 KG/M2 | HEIGHT: 63 IN | WEIGHT: 183 LBS

## 2021-12-16 DIAGNOSIS — Z12.31 VISIT FOR SCREENING MAMMOGRAM: Primary | ICD-10-CM

## 2021-12-16 DIAGNOSIS — N93.8 DYSFUNCTIONAL UTERINE BLEEDING: ICD-10-CM

## 2021-12-16 DIAGNOSIS — Z01.419 VISIT FOR GYNECOLOGIC EXAMINATION: Primary | ICD-10-CM

## 2021-12-16 PROCEDURE — 99396 PREV VISIT EST AGE 40-64: CPT | Performed by: OBSTETRICS & GYNECOLOGY

## 2021-12-16 PROCEDURE — 77067 SCR MAMMO BI INCL CAD: CPT | Performed by: RADIOLOGY

## 2021-12-16 PROCEDURE — 77067 SCR MAMMO BI INCL CAD: CPT | Performed by: OBSTETRICS & GYNECOLOGY

## 2021-12-16 PROCEDURE — 99213 OFFICE O/P EST LOW 20 MIN: CPT | Performed by: OBSTETRICS & GYNECOLOGY

## 2021-12-16 PROCEDURE — 77063 BREAST TOMOSYNTHESIS BI: CPT | Performed by: RADIOLOGY

## 2021-12-16 PROCEDURE — 77063 BREAST TOMOSYNTHESIS BI: CPT | Performed by: OBSTETRICS & GYNECOLOGY

## 2021-12-16 RX ORDER — METHIMAZOLE 5 MG/1
2.5 TABLET ORAL EVERY MORNING
COMMUNITY
Start: 2021-12-07

## 2021-12-16 RX ORDER — OMEPRAZOLE 40 MG/1
40 CAPSULE, DELAYED RELEASE ORAL DAILY PRN
COMMUNITY
Start: 2021-11-11 | End: 2022-03-18

## 2021-12-16 RX ORDER — BUSPIRONE HYDROCHLORIDE 10 MG/1
10 TABLET ORAL 3 TIMES DAILY PRN
COMMUNITY
Start: 2021-12-15 | End: 2022-12-19 | Stop reason: SDUPTHER

## 2021-12-17 NOTE — PROGRESS NOTES
[facility]  [address] [facility] [address], [address]  [place], [place] [address]  Phone: [phone] / Fax:  [phone]      12/16/2021    [address] [place] [id number]    Summer Vasquez APRN    Chief Complaint   Patient presents with   • Gynecologic Exam     Annual Exam, last pap 10-14-19 NL HPV (-). Mammogram today. Patient with irregular cycles would like to discuss hysterectomy.       Iris Dailey is here for annual gynecologic exam.  HPI - Patient with last normal pap 2 years ago.  Patient with normal mammogram last year and screening was repeated today.  She reports irregular cycles.  She has had 2 heavy cycles within past 2 months.  Other cycles are lighter.  She has had a normal ultrasound within the past 6 months.    Past Medical History:   Diagnosis Date   • Abnormal Pap smear of cervix    • Anxiety    • Graves disease    • History of MRSA infection     LEGS, 7-8 YEARS AGO, URGENT CARE IN [place]   • Hypothyroid    • Kidney stone 07-07-07   • Left breast mass    • PONV (postoperative nausea and vomiting)    • Thyroid eye disease        Past Surgical History:   Procedure Laterality Date   • APPENDECTOMY     • BREAST BIOPSY Left 2002    Benign   • BREAST BIOPSY Left 9/11/2017    Procedure: EXCISIONAL BIOPSY OF LEFT BREAST MASS WITH NEEDLE LOCALIZATION;  Surgeon: Malcolm Shannon MD;  Location: [facility] [place];  Service:    • DILATATION AND CURETTAGE      patient states several due to heavy bleeding   • KNEE ARTHROSCOPY Right    • SHOULDER SURGERY Left 07/26/2018    lypoma       Allergies   Allergen Reactions   • Codeine Hives and Nausea And Vomiting       Social History     Socioeconomic History   • Marital status: Single   Tobacco Use   • Smoking status: Never Smoker   • Smokeless tobacco: Never Used   Substance and Sexual Activity   • Alcohol use: No   • Drug use: No   • Sexual activity: Not Currently     Partners: Male     Birth control/protection: Inserts       Family History   Problem  "Relation Age of Onset   • Hyperlipidemia Father    • Heart disease Father    • Kidney disease Father    • Hyperlipidemia Mother    • Cervical cancer Mother    • Diabetes Mother    • Colon cancer Paternal Grandfather    • Heart disease Paternal Grandfather    • Hypothyroidism Paternal Grandmother         hypothyroidism   • Heart disease Paternal Grandmother    • Lymphoma Maternal Grandmother    • Heart disease Maternal Grandfather    • Brain cancer Maternal Uncle    • Hyperthyroidism Maternal Uncle         hyerthyroidism/thyroid cancer   • Thyroid cancer Maternal Uncle    • Malig Hyperthermia Neg Hx    • Breast cancer Neg Hx        Patient's last menstrual period was 10/05/2021 (exact date).    OB History        3    Para   1    Term   0       1    AB   2    Living   1       SAB   2    IAB        Ectopic        Molar        Multiple        Live Births   1          Obstetric Comments   Took birth control 8-12 months.             Vitals:    21 0923   BP: 151/97   Weight: 83 kg (183 lb)   Height: 160 cm (62.99\")       Physical Exam  Constitutional:       Appearance: Normal appearance. She is well-developed.   Genitourinary:      Urethral meatus normal.      Right Labia: No rash or tenderness.     Left Labia: No tenderness or rash.     No vaginal discharge or tenderness.        Right Adnexa: not tender and not full.     Left Adnexa: not tender and not full.     No cervical motion tenderness or lesion.      Uterus is not enlarged or tender.      No urethral tenderness present.   Breasts:      Right: No mass or nipple discharge.      Left: No mass or nipple discharge.       HENT:      Right Ear: External ear normal.      Left Ear: External ear normal.      Nose: Nose normal.   Eyes:      Conjunctiva/sclera: Conjunctivae normal.   Neck:      Thyroid: No thyromegaly.   Cardiovascular:      Rate and Rhythm: Normal rate and regular rhythm.      Heart sounds: Normal heart sounds.   Pulmonary:      Effort: " Pulmonary effort is normal.      Breath sounds: No stridor. No wheezing.   Abdominal:      Palpations: Abdomen is soft. There is no mass.      Tenderness: There is no guarding or rebound.   Musculoskeletal:         General: Normal range of motion.      Cervical back: Normal range of motion and neck supple.   Neurological:      Mental Status: She is alert.      Coordination: Coordination normal.   Skin:     General: Skin is warm and dry.   Psychiatric:         Mood and Affect: Mood normal.         Behavior: Behavior normal.         Thought Content: Thought content normal.         Judgment: Judgment normal.   Vitals reviewed. Exam conducted with a chaperone present.         Diagnoses and all orders for this visit:    1. Visit for gynecologic examination (Primary)  -  Discussed importance of regular screening and breast awareness.  -  Patient has been vaccinated against Covid 19.  2. Dysfunctional uterine bleeding  -  Patient with menstrual related complaints such as pain and moodiness.  She has intermittent heavy cycles with no known etiology.  Patient inquiring about proceeding with hysterectomy.  I discussed that surgery would eliminate bleeding issues but pain and moodiness are not guaranteed.  Discussed other modalities to control bleeding including IUD.  I discussed nature of hysterectomy with laparoscopic approach.  Discussed risks of procedure including bleeding, infection, injury to internal organs, anesthesia.  Patient voices understanding.  Pamphlets given on hysterectomy and IUD.      Wilber Han MD

## 2021-12-29 DIAGNOSIS — N93.8 DYSFUNCTIONAL UTERINE BLEEDING: Primary | ICD-10-CM

## 2022-01-06 PROBLEM — N93.8 DYSFUNCTIONAL UTERINE BLEEDING: Status: ACTIVE | Noted: 2022-01-06

## 2022-01-28 ENCOUNTER — PRE-ADMISSION TESTING (OUTPATIENT)
Dept: PREADMISSION TESTING | Facility: HOSPITAL | Age: 42
End: 2022-01-28

## 2022-01-28 VITALS
BODY MASS INDEX: 33.08 KG/M2 | WEIGHT: 186.7 LBS | RESPIRATION RATE: 16 BRPM | HEART RATE: 100 BPM | TEMPERATURE: 98.3 F | SYSTOLIC BLOOD PRESSURE: 142 MMHG | OXYGEN SATURATION: 99 % | DIASTOLIC BLOOD PRESSURE: 95 MMHG | HEIGHT: 63 IN

## 2022-01-28 LAB
ABO GROUP BLD: NORMAL
ANION GAP SERPL CALCULATED.3IONS-SCNC: 12 MMOL/L (ref 5–15)
BLD GP AB SCN SERPL QL: NEGATIVE
BUN SERPL-MCNC: 9 MG/DL (ref 6–20)
BUN/CREAT SERPL: 8.6 (ref 7–25)
CALCIUM SPEC-SCNC: 9.5 MG/DL (ref 8.6–10.5)
CHLORIDE SERPL-SCNC: 107 MMOL/L (ref 98–107)
CO2 SERPL-SCNC: 24 MMOL/L (ref 22–29)
CREAT SERPL-MCNC: 1.05 MG/DL (ref 0.57–1)
DEPRECATED RDW RBC AUTO: 43.8 FL (ref 37–54)
ERYTHROCYTE [DISTWIDTH] IN BLOOD BY AUTOMATED COUNT: 13.4 % (ref 12.3–15.4)
GFR SERPL CREATININE-BSD FRML MDRD: 57 ML/MIN/1.73
GLUCOSE SERPL-MCNC: 111 MG/DL (ref 65–99)
HCG SERPL QL: NEGATIVE
HCT VFR BLD AUTO: 39.9 % (ref 34–46.6)
HGB BLD-MCNC: 12.9 G/DL (ref 12–15.9)
MCH RBC QN AUTO: 28.6 PG (ref 26.6–33)
MCHC RBC AUTO-ENTMCNC: 32.3 G/DL (ref 31.5–35.7)
MCV RBC AUTO: 88.5 FL (ref 79–97)
PLATELET # BLD AUTO: 249 10*3/MM3 (ref 140–450)
PMV BLD AUTO: 9.6 FL (ref 6–12)
POTASSIUM SERPL-SCNC: 4.4 MMOL/L (ref 3.5–5.2)
RBC # BLD AUTO: 4.51 10*6/MM3 (ref 3.77–5.28)
RH BLD: POSITIVE
SARS-COV-2 ORF1AB RESP QL NAA+PROBE: DETECTED
SODIUM SERPL-SCNC: 143 MMOL/L (ref 136–145)
T&S EXPIRATION DATE: NORMAL
WBC NRBC COR # BLD: 8.58 10*3/MM3 (ref 3.4–10.8)

## 2022-01-28 PROCEDURE — 86850 RBC ANTIBODY SCREEN: CPT

## 2022-01-28 PROCEDURE — 80048 BASIC METABOLIC PNL TOTAL CA: CPT

## 2022-01-28 PROCEDURE — 86900 BLOOD TYPING SEROLOGIC ABO: CPT

## 2022-01-28 PROCEDURE — 86901 BLOOD TYPING SEROLOGIC RH(D): CPT

## 2022-01-28 PROCEDURE — U0004 COV-19 TEST NON-CDC HGH THRU: HCPCS

## 2022-01-28 PROCEDURE — 85027 COMPLETE CBC AUTOMATED: CPT

## 2022-01-28 PROCEDURE — 36415 COLL VENOUS BLD VENIPUNCTURE: CPT

## 2022-01-28 PROCEDURE — C9803 HOPD COVID-19 SPEC COLLECT: HCPCS

## 2022-01-28 PROCEDURE — 84703 CHORIONIC GONADOTROPIN ASSAY: CPT

## 2022-01-28 RX ORDER — CETIRIZINE HYDROCHLORIDE 10 MG/1
10 TABLET ORAL DAILY
COMMUNITY
End: 2022-03-18 | Stop reason: ALTCHOICE

## 2022-01-31 ENCOUNTER — TELEPHONE (OUTPATIENT)
Dept: OBSTETRICS AND GYNECOLOGY | Facility: CLINIC | Age: 42
End: 2022-01-31

## 2022-01-31 NOTE — TELEPHONE ENCOUNTER
"Ankita    I tentatively rescheduled her surgery for February 11th.    I do not have a exact time of surgery and that date may not work out.  However, if she wants to \"hold\" the date, I will.  Within the next week, we will let her know if we can proceed.  Let her know that her surgery has been also denied, but we will talk to the medical director to see if we can get it reauthorized.  See if you can set up a peer review for Thursday or Friday around noon.    Thanks    Guille  "

## 2022-01-31 NOTE — TELEPHONE ENCOUNTER
Dr Han,    I spoke with [facility] last week who stated that pts surgery did not require an auth but this morning they called to stated that the medical director denied the [facility] stating that it wasn't medically necessary.  I argued about being told last week that no auth was needed but they stated it didn't matter it was still denied.  We can try to schedule a peer to peer if you would like.      Thanks,Ankita

## 2022-02-01 ENCOUNTER — TRANSCRIBE ORDERS (OUTPATIENT)
Dept: ADMINISTRATIVE | Facility: HOSPITAL | Age: 42
End: 2022-02-01

## 2022-02-01 DIAGNOSIS — Z01.818 OTHER SPECIFIED PRE-OPERATIVE EXAMINATION: Primary | ICD-10-CM

## 2022-02-02 ENCOUNTER — TELEPHONE (OUTPATIENT)
Dept: OBSTETRICS AND GYNECOLOGY | Facility: CLINIC | Age: 42
End: 2022-02-02

## 2022-02-02 NOTE — TELEPHONE ENCOUNTER
Dr Han is waiting for a peer to peer call from [facility] about this pt.  If they ask to speak with him please get Ankita or call him yourself.

## 2022-02-04 ENCOUNTER — TELEPHONE (OUTPATIENT)
Dept: OBSTETRICS AND GYNECOLOGY | Facility: CLINIC | Age: 42
End: 2022-02-04

## 2022-02-07 ENCOUNTER — OFFICE VISIT (OUTPATIENT)
Dept: OBSTETRICS AND GYNECOLOGY | Facility: CLINIC | Age: 42
End: 2022-02-07

## 2022-02-07 VITALS
WEIGHT: 185 LBS | HEIGHT: 63 IN | DIASTOLIC BLOOD PRESSURE: 102 MMHG | BODY MASS INDEX: 32.78 KG/M2 | SYSTOLIC BLOOD PRESSURE: 154 MMHG

## 2022-02-07 DIAGNOSIS — N93.8 DYSFUNCTIONAL UTERINE BLEEDING: Primary | ICD-10-CM

## 2022-02-07 LAB
B-HCG UR QL: NEGATIVE
EXPIRATION DATE: NORMAL
INTERNAL NEGATIVE CONTROL: NEGATIVE
INTERNAL POSITIVE CONTROL: POSITIVE
Lab: NORMAL

## 2022-02-07 PROCEDURE — 58100 BIOPSY OF UTERUS LINING: CPT | Performed by: OBSTETRICS & GYNECOLOGY

## 2022-02-07 PROCEDURE — 81025 URINE PREGNANCY TEST: CPT | Performed by: OBSTETRICS & GYNECOLOGY

## 2022-02-07 PROCEDURE — 99212 OFFICE O/P EST SF 10 MIN: CPT | Performed by: OBSTETRICS & GYNECOLOGY

## 2022-02-07 RX ORDER — ETONOGESTREL AND ETHINYL ESTRADIOL 11.7; 2.7 MG/1; MG/1
INSERT, EXTENDED RELEASE VAGINAL
COMMUNITY
Start: 2022-01-29 | End: 2022-03-18

## 2022-02-07 NOTE — PROGRESS NOTES
Subjective   Iris Dailey is a 41 y.o. female.     Cc:  Bleeding, pre-op    History of Present Illness - 41 year old female with abnormal uterine bleeding refractory to medical therapy.  She was counseled on options and wanted to proceed with hysterectomy.  Her insurance company would not authorize surgery without endometrial biopsy despite her endometrial lining being in acceptable range from a pre-menopause patient.  She is here to complete procedure in preparation for surgery.    The following portions of the patient's history were reviewed and updated as appropriate:   She  has a past medical history of Abnormal Pap smear of cervix, Anxiety, COVID-19 (01/28/2022), Dysfunctional uterine bleeding, Graves disease, History of headache, History of MRSA infection, Kidney stone (07-07-07), Left breast mass, PONV (postoperative nausea and vomiting), Thyroid eye disease, and Watery eyes.  Current Outpatient Medications   Medication Sig Dispense Refill   • busPIRone (BUSPAR) 10 MG tablet Take 10 mg by mouth 3 (Three) Times a Day As Needed (ANXIETY).     • cetirizine (zyrTEC) 10 MG tablet Take 10 mg by mouth Daily.     • etonogestrel-ethinyl estradiol (NUVARING) 0.12-0.015 MG/24HR vaginal ring INSERT 1 RING VAGINALLY FOR 21 DAYS, THEN REMOVE FOR 7 DAYS     • methIMAzole (TAPAZOLE) 5 MG tablet Take 5 mg by mouth Daily.     • omeprazole (priLOSEC) 40 MG capsule Take 40 mg by mouth Daily As Needed (AS NEEDED FOR INDIGESTION).     • venlafaxine XR (EFFEXOR-XR) 37.5 MG 24 hr capsule Take 37.5 mg by mouth Daily.       No current facility-administered medications for this visit.     She is allergic to codeine..    Review of Systems   Constitutional: Negative for chills and fever.   Genitourinary: Positive for menstrual problem.       Objective   Physical Exam  Vitals reviewed. Exam conducted with a chaperone present.   Constitutional:       Appearance: Normal appearance.   Genitourinary:     General: Normal vulva.      Exam  position: Lithotomy position.      Vagina: Normal.      Cervix: Normal.   Neurological:      Mental Status: She is alert.   Psychiatric:         Mood and Affect: Mood normal.         Behavior: Behavior normal.         Thought Content: Thought content normal.         Judgment: Judgment normal.       Procedure Note:  Pre/Post diagnosis - Dysfunctional Uterine Bleeding.  Procedure Name - Endometrial Biopsy.  Description of Procedure - Intent of procedure described to patient and verbal consent given.  Patient was placed in lithotomy position on the table and a bivalve speculum was placed in the vagina.  The cervix was well visualized and swabbed with betadine.  The anterior lip of the cervix was grasped with a tenaculum and a pipelle was introduced into the cervix.  The pipelle sounded to 7 cm.   It was advanced to the fundus and tissue was obtained.  The amount of tissue was scant but two passes were made with pipelle and tissue was sent for permanent pathology.  Patient tolerated procedure well and all instruments were removed from the vagina.    Assessment/Plan   Diagnoses and all orders for this visit:    1. Dysfunctional uterine bleeding (Primary)  -     Reference Histopathology  -     Endometrial Biopsy  -     Await results.  If normal, patient is considering laparoscopic hysterectomy this Friday.  She understands the procedure, recovery and pros/cons.     Wilber Han MD

## 2022-02-09 ENCOUNTER — LAB (OUTPATIENT)
Dept: LAB | Facility: HOSPITAL | Age: 42
End: 2022-02-09

## 2022-02-09 ENCOUNTER — TELEPHONE (OUTPATIENT)
Dept: OBSTETRICS AND GYNECOLOGY | Facility: CLINIC | Age: 42
End: 2022-02-09

## 2022-02-09 DIAGNOSIS — Z01.818 OTHER SPECIFIED PRE-OPERATIVE EXAMINATION: ICD-10-CM

## 2022-02-09 LAB
DX ICD CODE: NORMAL
DX ICD CODE: NORMAL
PATH REPORT.FINAL DX SPEC: NORMAL
PATH REPORT.GROSS SPEC: NORMAL
PATH REPORT.SITE OF ORIGIN SPEC: NORMAL
PATHOLOGIST NAME: NORMAL
PAYMENT PROCEDURE: NORMAL
SARS-COV-2 RNA PNL SPEC NAA+PROBE: NOT DETECTED

## 2022-02-09 PROCEDURE — C9803 HOPD COVID-19 SPEC COLLECT: HCPCS

## 2022-02-09 PROCEDURE — U0004 COV-19 TEST NON-CDC HGH THRU: HCPCS

## 2022-02-09 NOTE — TELEPHONE ENCOUNTER
Good afternoon,   Patient is calling in regards to surgery she is suppose to have.   Patient stated she wants an update on her surgery.  Patient stated she wants to know if provider found anything on biopsy from Monday.  Patient also stated she wants to get surgery figured out for insurance purposes.   Patient would like a call from provider when he can.  Patient stated provider can call her cell phone at any time.      Please advise,   Thank you.

## 2022-02-09 NOTE — TELEPHONE ENCOUNTER
Ankita    Patient with normal endometrial biopsy.    Can you confirm that we are set for her hysterectomy on Friday?  Let her know.    Thanks    Guille

## 2022-02-10 NOTE — TELEPHONE ENCOUNTER
Patient declined ablation.  Hysterectomy is on appeal thru insurance company.  Patient will await appeal.

## 2022-03-17 ENCOUNTER — TRANSCRIBE ORDERS (OUTPATIENT)
Dept: OBSTETRICS AND GYNECOLOGY | Facility: CLINIC | Age: 42
End: 2022-03-17

## 2022-03-17 DIAGNOSIS — N93.8 DYSFUNCTIONAL UTERINE BLEEDING: Primary | ICD-10-CM

## 2022-03-18 ENCOUNTER — PRE-ADMISSION TESTING (OUTPATIENT)
Dept: PREADMISSION TESTING | Facility: HOSPITAL | Age: 42
End: 2022-03-18

## 2022-03-18 VITALS
OXYGEN SATURATION: 98 % | HEIGHT: 63 IN | WEIGHT: 188.5 LBS | SYSTOLIC BLOOD PRESSURE: 151 MMHG | TEMPERATURE: 98 F | BODY MASS INDEX: 33.4 KG/M2 | DIASTOLIC BLOOD PRESSURE: 94 MMHG | RESPIRATION RATE: 16 BRPM | HEART RATE: 99 BPM

## 2022-03-18 DIAGNOSIS — N93.8 DYSFUNCTIONAL UTERINE BLEEDING: ICD-10-CM

## 2022-03-18 LAB
ABO GROUP BLD: NORMAL
ANION GAP SERPL CALCULATED.3IONS-SCNC: 11.1 MMOL/L (ref 5–15)
BLD GP AB SCN SERPL QL: NEGATIVE
BUN SERPL-MCNC: 16 MG/DL (ref 6–20)
BUN/CREAT SERPL: 17 (ref 7–25)
CALCIUM SPEC-SCNC: 9.8 MG/DL (ref 8.6–10.5)
CHLORIDE SERPL-SCNC: 105 MMOL/L (ref 98–107)
CO2 SERPL-SCNC: 24.9 MMOL/L (ref 22–29)
CREAT SERPL-MCNC: 0.94 MG/DL (ref 0.57–1)
DEPRECATED RDW RBC AUTO: 42.7 FL (ref 37–54)
EGFRCR SERPLBLD CKD-EPI 2021: 77.4 ML/MIN/1.73
ERYTHROCYTE [DISTWIDTH] IN BLOOD BY AUTOMATED COUNT: 13.3 % (ref 12.3–15.4)
GLUCOSE SERPL-MCNC: 109 MG/DL (ref 65–99)
HCG INTACT+B SERPL-ACNC: <0.5 MIU/ML
HCT VFR BLD AUTO: 39.8 % (ref 34–46.6)
HGB BLD-MCNC: 13.6 G/DL (ref 12–15.9)
MCH RBC QN AUTO: 30 PG (ref 26.6–33)
MCHC RBC AUTO-ENTMCNC: 34.2 G/DL (ref 31.5–35.7)
MCV RBC AUTO: 87.9 FL (ref 79–97)
PLATELET # BLD AUTO: 251 10*3/MM3 (ref 140–450)
PMV BLD AUTO: 10.2 FL (ref 6–12)
POTASSIUM SERPL-SCNC: 4.4 MMOL/L (ref 3.5–5.2)
RBC # BLD AUTO: 4.53 10*6/MM3 (ref 3.77–5.28)
RH BLD: POSITIVE
SARS-COV-2 ORF1AB RESP QL NAA+PROBE: NOT DETECTED
SODIUM SERPL-SCNC: 141 MMOL/L (ref 136–145)
T&S EXPIRATION DATE: NORMAL
WBC NRBC COR # BLD: 7.65 10*3/MM3 (ref 3.4–10.8)

## 2022-03-18 PROCEDURE — 80048 BASIC METABOLIC PNL TOTAL CA: CPT

## 2022-03-18 PROCEDURE — 86850 RBC ANTIBODY SCREEN: CPT

## 2022-03-18 PROCEDURE — 86901 BLOOD TYPING SEROLOGIC RH(D): CPT

## 2022-03-18 PROCEDURE — C9803 HOPD COVID-19 SPEC COLLECT: HCPCS

## 2022-03-18 PROCEDURE — 85027 COMPLETE CBC AUTOMATED: CPT

## 2022-03-18 PROCEDURE — U0004 COV-19 TEST NON-CDC HGH THRU: HCPCS

## 2022-03-18 PROCEDURE — 36415 COLL VENOUS BLD VENIPUNCTURE: CPT

## 2022-03-18 PROCEDURE — 86900 BLOOD TYPING SEROLOGIC ABO: CPT

## 2022-03-18 PROCEDURE — 84702 CHORIONIC GONADOTROPIN TEST: CPT

## 2022-03-18 NOTE — DISCHARGE INSTRUCTIONS
Take the following medications the morning of surgery:  METHIMAZOLE, EFFEXOR      ARRIVE 5:30 AM  3/21    If you are on prescription narcotic pain medication to control your pain you may also take that medication the morning of surgery.    General Instructions:  Do not eat solid food after midnight the night before surgery.  You may drink clear liquids day of surgery but must stop at least one hour before your hospital arrival time.  It is beneficial for you to have a clear drink that contains carbohydrates the day of surgery.  We suggest a 12 to 20 ounce bottle of Gatorade or Powerade for non-diabetic patients or a 12 to 20 ounce bottle of G2 or Powerade Zero for diabetic patients. (Pediatric patients, are not advised to drink a 12 to 20 ounce carbohydrate drink)    Clear liquids are liquids you can see through.  Nothing red in color.     Plain water                               Sports drinks  Sodas                                   Gelatin (Jell-O)  Fruit juices without pulp such as white grape juice and apple juice  Popsicles that contain no fruit or yogurt  Tea or coffee (no cream or milk added)  Gatorade / Powerade  G2 / Powerade Zero    Infants may have breast milk up to four hours before surgery.  Infants drinking formula may drink formula up to six hours before surgery.   Patients who avoid smoking, chewing tobacco and alcohol for 4 weeks prior to surgery have a reduced risk of post-operative complications.  Quit smoking as many days before surgery as you can.  Do not smoke, use chewing tobacco or drink alcohol the day of surgery.   If applicable bring your C-PAP/ BI-PAP machine.  Bring any papers given to you in the doctor’s office.  Wear clean comfortable clothes.  Do not wear contact lenses, false eyelashes or make-up.  Bring a case for your glasses.   Bring crutches or walker if applicable.  Remove all piercings.  Leave jewelry and any other valuables at home.  Hair extensions with metal clips must be  removed prior to surgery.  The Pre-Admission Testing nurse will instruct you to bring medications if unable to obtain an accurate list in Pre-Admission Testing.        If you were given a blood bank ID arm band remember to bring it with you the day of surgery.    Preventing a Surgical Site Infection:  For 2 to 3 days before surgery, avoid shaving with a razor because the razor can irritate skin and make it easier to develop an infection.    Any areas of open skin can increase the risk of a post-operative wound infection by allowing bacteria to enter and travel throughout the body.  Notify your surgeon if you have any skin wounds / rashes even if it is not near the expected surgical site.  The area will need assessed to determine if surgery should be delayed until it is healed.  The night prior to surgery shower using a fresh bar of anti-bacterial soap (such as Dial) and clean washcloth.  Sleep in a clean bed with clean clothing.  Do not allow pets to sleep with you.  Shower on the morning of surgery using a fresh bar of anti-bacterial soap (such as Dial) and clean washcloth.  Dry with a clean towel and dress in clean clothing.  Ask your surgeon if you will be receiving antibiotics prior to surgery.  Make sure you, your family, and all healthcare providers clean their hands with soap and water or an alcohol based hand  before caring for you or your wound.    Day of surgery:  Your arrival time is approximately two hours before your scheduled surgery time.  Upon arrival, a Pre-op nurse and Anesthesiologist will review your health history, obtain vital signs, and answer questions you may have.  The only belongings needed at this time will be a list of your home medications and if applicable your C-PAP/BI-PAP machine.  A Pre-op nurse will start an IV and you may receive medication in preparation for surgery, including something to help you relax.     Please be aware that surgery does come with discomfort.  We  want to make every effort to control your discomfort so please discuss any uncontrolled symptoms with your nurse.   Your doctor will most likely have prescribed pain medications.      If you are going home after surgery you will receive individualized written care instructions before being discharged.  A responsible adult must drive you to and from the hospital on the day of your surgery and stay with you for 24 hours.  Discharge prescriptions can be filled by the hospital pharmacy during regular pharmacy hours.  If you are having surgery late in the day/evening your prescription may be e-prescribed to your pharmacy.  Please verify your pharmacy hours or chose a 24 hour pharmacy to avoid not having access to your prescription because your pharmacy has closed for the day.    If you are staying overnight following surgery, you will be transported to your hospital room following the recovery period.  [facility] has all private rooms.    If you have any questions please call Pre-Admission Testing at [phone].  Deductibles and co-payments are collected on the day of service. Please be prepared to pay the required co-pay, deductible or deposit on the day of service as defined by your plan.    Patient Education for Self-Quarantine Process    Following your COVID testing, we strongly recommend that you wear a mask when you are with other people and practice social distancing.   Limit your activities to only required outings.  Wash your hands with soap and water frequently for at least 20 seconds.   Avoid touching your eyes, nose and mouth with unwashed hands.  Do not share anything - utensils, drinking glasses, food from the same bowl.   Sanitize household surfaces daily. Include all high touch areas (door handles, light switches, phones, countertops, etc.)    Call your surgeon immediately if you experience any of the following symptoms:  Sore Throat  Shortness of Breath or difficulty  breathing  Cough  Chills  Body soreness or muscle pain  Headache  Fever  New loss of taste or smell  Do not arrive for your surgery ill.  Your procedure will need to be rescheduled to another time.  You will need to call your physician before the day of surgery to avoid any unnecessary exposure to hospital staff as well as other patients.

## 2022-03-21 ENCOUNTER — HOSPITAL ENCOUNTER (OUTPATIENT)
Facility: HOSPITAL | Age: 42
Setting detail: HOSPITAL OUTPATIENT SURGERY
Discharge: HOME | End: 2022-03-21
Attending: OBSTETRICS & GYNECOLOGY | Admitting: OBSTETRICS & GYNECOLOGY

## 2022-03-21 ENCOUNTER — ANESTHESIA (OUTPATIENT)
Dept: PERIOP | Facility: HOSPITAL | Age: 42
End: 2022-03-21

## 2022-03-21 ENCOUNTER — ANESTHESIA EVENT (OUTPATIENT)
Dept: PERIOP | Facility: HOSPITAL | Age: 42
End: 2022-03-21

## 2022-03-21 VITALS
HEART RATE: 75 BPM | SYSTOLIC BLOOD PRESSURE: 135 MMHG | TEMPERATURE: 97.7 F | DIASTOLIC BLOOD PRESSURE: 90 MMHG | RESPIRATION RATE: 16 BRPM | OXYGEN SATURATION: 93 %

## 2022-03-21 DIAGNOSIS — N93.8 DYSFUNCTIONAL UTERINE BLEEDING: ICD-10-CM

## 2022-03-21 DIAGNOSIS — Z98.890 POSTOPERATIVE STATE: Primary | ICD-10-CM

## 2022-03-21 LAB
DEPRECATED RDW RBC AUTO: 46 FL (ref 37–54)
ERYTHROCYTE [DISTWIDTH] IN BLOOD BY AUTOMATED COUNT: 13.5 % (ref 12.3–15.4)
HCT VFR BLD AUTO: 38.6 % (ref 34–46.6)
HGB BLD-MCNC: 12.8 G/DL (ref 12–15.9)
MCH RBC QN AUTO: 30.5 PG (ref 26.6–33)
MCHC RBC AUTO-ENTMCNC: 33.2 G/DL (ref 31.5–35.7)
MCV RBC AUTO: 91.9 FL (ref 79–97)
PLATELET # BLD AUTO: 219 10*3/MM3 (ref 140–450)
PMV BLD AUTO: 9.9 FL (ref 6–12)
RBC # BLD AUTO: 4.2 10*6/MM3 (ref 3.77–5.28)
WBC NRBC COR # BLD: 14.44 10*3/MM3 (ref 3.4–10.8)

## 2022-03-21 PROCEDURE — 25010000002 HYDROMORPHONE PER 4 MG: Performed by: NURSE ANESTHETIST, CERTIFIED REGISTERED

## 2022-03-21 PROCEDURE — 58571 TLH W/T/O 250 G OR LESS: CPT | Performed by: OBSTETRICS & GYNECOLOGY

## 2022-03-21 PROCEDURE — 85027 COMPLETE CBC AUTOMATED: CPT | Performed by: OBSTETRICS & GYNECOLOGY

## 2022-03-21 PROCEDURE — S0260 H&P FOR SURGERY: HCPCS | Performed by: OBSTETRICS & GYNECOLOGY

## 2022-03-21 PROCEDURE — 25010000002 PROPOFOL 10 MG/ML EMULSION: Performed by: NURSE ANESTHETIST, CERTIFIED REGISTERED

## 2022-03-21 PROCEDURE — 25010000002 ONDANSETRON PER 1 MG: Performed by: NURSE ANESTHETIST, CERTIFIED REGISTERED

## 2022-03-21 PROCEDURE — 25010000002 KETOROLAC TROMETHAMINE PER 15 MG: Performed by: NURSE ANESTHETIST, CERTIFIED REGISTERED

## 2022-03-21 PROCEDURE — C1889 IMPLANT/INSERT DEVICE, NOC: HCPCS | Performed by: OBSTETRICS & GYNECOLOGY

## 2022-03-21 PROCEDURE — 25010000002 NEOSTIGMINE 5 MG/10ML SOLUTION: Performed by: NURSE ANESTHETIST, CERTIFIED REGISTERED

## 2022-03-21 PROCEDURE — 25010000002 DEXAMETHASONE PER 1 MG: Performed by: NURSE ANESTHETIST, CERTIFIED REGISTERED

## 2022-03-21 PROCEDURE — 25010000002 CEFAZOLIN IN DEXTROSE 2-4 GM/100ML-% SOLUTION: Performed by: OBSTETRICS & GYNECOLOGY

## 2022-03-21 PROCEDURE — 25010000002 FENTANYL CITRATE (PF) 50 MCG/ML SOLUTION: Performed by: NURSE ANESTHETIST, CERTIFIED REGISTERED

## 2022-03-21 PROCEDURE — 88307 TISSUE EXAM BY PATHOLOGIST: CPT | Performed by: OBSTETRICS & GYNECOLOGY

## 2022-03-21 PROCEDURE — 63710000001 PROMETHAZINE PER 25 MG: Performed by: NURSE ANESTHETIST, CERTIFIED REGISTERED

## 2022-03-21 PROCEDURE — 25010000002 PHENYLEPHRINE 10 MG/ML SOLUTION: Performed by: NURSE ANESTHETIST, CERTIFIED REGISTERED

## 2022-03-21 DEVICE — ABSORBABLE RELOAD
Type: IMPLANTABLE DEVICE | Site: ABDOMEN | Status: FUNCTIONAL
Brand: V-LOC 180

## 2022-03-21 RX ORDER — SODIUM CHLORIDE 0.9 % (FLUSH) 0.9 %
3 SYRINGE (ML) INJECTION EVERY 12 HOURS SCHEDULED
Status: DISCONTINUED | OUTPATIENT
Start: 2022-03-21 | End: 2022-03-21 | Stop reason: HOSPADM

## 2022-03-21 RX ORDER — CEFAZOLIN SODIUM 2 G/100ML
2 INJECTION, SOLUTION INTRAVENOUS ONCE
Status: COMPLETED | OUTPATIENT
Start: 2022-03-21 | End: 2022-03-21

## 2022-03-21 RX ORDER — OXYCODONE HYDROCHLORIDE AND ACETAMINOPHEN 5; 325 MG/1; MG/1
2 TABLET ORAL EVERY 6 HOURS PRN
Qty: 25 TABLET | Refills: 0 | Status: SHIPPED | OUTPATIENT
Start: 2022-03-21 | End: 2022-03-28

## 2022-03-21 RX ORDER — PROMETHAZINE HYDROCHLORIDE 25 MG/1
25 SUPPOSITORY RECTAL ONCE AS NEEDED
Status: COMPLETED | OUTPATIENT
Start: 2022-03-21 | End: 2022-03-21

## 2022-03-21 RX ORDER — FLUMAZENIL 0.1 MG/ML
0.2 INJECTION INTRAVENOUS AS NEEDED
Status: DISCONTINUED | OUTPATIENT
Start: 2022-03-21 | End: 2022-03-21 | Stop reason: HOSPADM

## 2022-03-21 RX ORDER — FENTANYL CITRATE 50 UG/ML
INJECTION, SOLUTION INTRAMUSCULAR; INTRAVENOUS AS NEEDED
Status: DISCONTINUED | OUTPATIENT
Start: 2022-03-21 | End: 2022-03-21 | Stop reason: SURG

## 2022-03-21 RX ORDER — PROPOFOL 10 MG/ML
VIAL (ML) INTRAVENOUS AS NEEDED
Status: DISCONTINUED | OUTPATIENT
Start: 2022-03-21 | End: 2022-03-21 | Stop reason: SURG

## 2022-03-21 RX ORDER — NEOSTIGMINE METHYLSULFATE 0.5 MG/ML
INJECTION, SOLUTION INTRAVENOUS AS NEEDED
Status: DISCONTINUED | OUTPATIENT
Start: 2022-03-21 | End: 2022-03-21 | Stop reason: SURG

## 2022-03-21 RX ORDER — ROCURONIUM BROMIDE 10 MG/ML
INJECTION, SOLUTION INTRAVENOUS AS NEEDED
Status: DISCONTINUED | OUTPATIENT
Start: 2022-03-21 | End: 2022-03-21 | Stop reason: SURG

## 2022-03-21 RX ORDER — FAMOTIDINE 10 MG/ML
20 INJECTION, SOLUTION INTRAVENOUS ONCE
Status: COMPLETED | OUTPATIENT
Start: 2022-03-21 | End: 2022-03-21

## 2022-03-21 RX ORDER — MIDAZOLAM HYDROCHLORIDE 1 MG/ML
2 INJECTION INTRAMUSCULAR; INTRAVENOUS
Status: DISCONTINUED | OUTPATIENT
Start: 2022-03-21 | End: 2022-03-21 | Stop reason: HOSPADM

## 2022-03-21 RX ORDER — LIDOCAINE HYDROCHLORIDE 20 MG/ML
INJECTION, SOLUTION INFILTRATION; PERINEURAL AS NEEDED
Status: DISCONTINUED | OUTPATIENT
Start: 2022-03-21 | End: 2022-03-21 | Stop reason: SURG

## 2022-03-21 RX ORDER — SODIUM CHLORIDE 0.9 % (FLUSH) 0.9 %
3-10 SYRINGE (ML) INJECTION AS NEEDED
Status: DISCONTINUED | OUTPATIENT
Start: 2022-03-21 | End: 2022-03-21 | Stop reason: HOSPADM

## 2022-03-21 RX ORDER — ACETAMINOPHEN 650 MG/1
650 SUPPOSITORY RECTAL ONCE AS NEEDED
Status: DISCONTINUED | OUTPATIENT
Start: 2022-03-21 | End: 2022-03-21 | Stop reason: HOSPADM

## 2022-03-21 RX ORDER — HYDROCODONE BITARTRATE AND ACETAMINOPHEN 7.5; 325 MG/1; MG/1
1 TABLET ORAL ONCE AS NEEDED
Status: COMPLETED | OUTPATIENT
Start: 2022-03-21 | End: 2022-03-21

## 2022-03-21 RX ORDER — ONDANSETRON 2 MG/ML
4 INJECTION INTRAMUSCULAR; INTRAVENOUS ONCE AS NEEDED
Status: COMPLETED | OUTPATIENT
Start: 2022-03-21 | End: 2022-03-21

## 2022-03-21 RX ORDER — DEXAMETHASONE SODIUM PHOSPHATE 10 MG/ML
INJECTION INTRAMUSCULAR; INTRAVENOUS AS NEEDED
Status: DISCONTINUED | OUTPATIENT
Start: 2022-03-21 | End: 2022-03-21 | Stop reason: SURG

## 2022-03-21 RX ORDER — SCOLOPAMINE TRANSDERMAL SYSTEM 1 MG/1
1 PATCH, EXTENDED RELEASE TRANSDERMAL CONTINUOUS
Status: DISCONTINUED | OUTPATIENT
Start: 2022-03-21 | End: 2022-03-21 | Stop reason: HOSPADM

## 2022-03-21 RX ORDER — SODIUM CHLORIDE, SODIUM LACTATE, POTASSIUM CHLORIDE, CALCIUM CHLORIDE 600; 310; 30; 20 MG/100ML; MG/100ML; MG/100ML; MG/100ML
9 INJECTION, SOLUTION INTRAVENOUS CONTINUOUS
Status: DISCONTINUED | OUTPATIENT
Start: 2022-03-21 | End: 2022-03-21 | Stop reason: HOSPADM

## 2022-03-21 RX ORDER — EPHEDRINE SULFATE 50 MG/ML
5 INJECTION, SOLUTION INTRAVENOUS ONCE AS NEEDED
Status: DISCONTINUED | OUTPATIENT
Start: 2022-03-21 | End: 2022-03-21 | Stop reason: HOSPADM

## 2022-03-21 RX ORDER — DIPHENHYDRAMINE HCL 25 MG
25 CAPSULE ORAL
Status: DISCONTINUED | OUTPATIENT
Start: 2022-03-21 | End: 2022-03-21 | Stop reason: HOSPADM

## 2022-03-21 RX ORDER — PROMETHAZINE HYDROCHLORIDE 25 MG/1
25 TABLET ORAL ONCE AS NEEDED
Status: COMPLETED | OUTPATIENT
Start: 2022-03-21 | End: 2022-03-21

## 2022-03-21 RX ORDER — DIPHENHYDRAMINE HYDROCHLORIDE 50 MG/ML
12.5 INJECTION INTRAMUSCULAR; INTRAVENOUS
Status: DISCONTINUED | OUTPATIENT
Start: 2022-03-21 | End: 2022-03-21 | Stop reason: HOSPADM

## 2022-03-21 RX ORDER — GABAPENTIN 300 MG/1
300 CAPSULE ORAL ONCE
Status: COMPLETED | OUTPATIENT
Start: 2022-03-21 | End: 2022-03-21

## 2022-03-21 RX ORDER — FENTANYL CITRATE 50 UG/ML
50 INJECTION, SOLUTION INTRAMUSCULAR; INTRAVENOUS
Status: DISCONTINUED | OUTPATIENT
Start: 2022-03-21 | End: 2022-03-21 | Stop reason: HOSPADM

## 2022-03-21 RX ORDER — HYDROMORPHONE HYDROCHLORIDE 1 MG/ML
0.5 INJECTION, SOLUTION INTRAMUSCULAR; INTRAVENOUS; SUBCUTANEOUS
Status: DISCONTINUED | OUTPATIENT
Start: 2022-03-21 | End: 2022-03-21 | Stop reason: HOSPADM

## 2022-03-21 RX ORDER — BUPIVACAINE HYDROCHLORIDE AND EPINEPHRINE 2.5; 5 MG/ML; UG/ML
INJECTION, SOLUTION EPIDURAL; INFILTRATION; INTRACAUDAL; PERINEURAL AS NEEDED
Status: DISCONTINUED | OUTPATIENT
Start: 2022-03-21 | End: 2022-03-21 | Stop reason: HOSPADM

## 2022-03-21 RX ORDER — HYDRALAZINE HYDROCHLORIDE 20 MG/ML
5 INJECTION INTRAMUSCULAR; INTRAVENOUS
Status: DISCONTINUED | OUTPATIENT
Start: 2022-03-21 | End: 2022-03-21 | Stop reason: HOSPADM

## 2022-03-21 RX ORDER — OXYCODONE AND ACETAMINOPHEN 7.5; 325 MG/1; MG/1
1 TABLET ORAL EVERY 4 HOURS PRN
Status: DISCONTINUED | OUTPATIENT
Start: 2022-03-21 | End: 2022-03-21 | Stop reason: HOSPADM

## 2022-03-21 RX ORDER — KETAMINE HYDROCHLORIDE 10 MG/ML
INJECTION INTRAMUSCULAR; INTRAVENOUS AS NEEDED
Status: DISCONTINUED | OUTPATIENT
Start: 2022-03-21 | End: 2022-03-21 | Stop reason: SURG

## 2022-03-21 RX ORDER — ONDANSETRON 2 MG/ML
INJECTION INTRAMUSCULAR; INTRAVENOUS AS NEEDED
Status: DISCONTINUED | OUTPATIENT
Start: 2022-03-21 | End: 2022-03-21 | Stop reason: SURG

## 2022-03-21 RX ORDER — PHENYLEPHRINE HYDROCHLORIDE 10 MG/ML
INJECTION INTRAVENOUS AS NEEDED
Status: DISCONTINUED | OUTPATIENT
Start: 2022-03-21 | End: 2022-03-21 | Stop reason: SURG

## 2022-03-21 RX ORDER — MAGNESIUM HYDROXIDE 1200 MG/15ML
LIQUID ORAL AS NEEDED
Status: DISCONTINUED | OUTPATIENT
Start: 2022-03-21 | End: 2022-03-21 | Stop reason: HOSPADM

## 2022-03-21 RX ORDER — OXYCODONE HYDROCHLORIDE AND ACETAMINOPHEN 5; 325 MG/1; MG/1
2 TABLET ORAL EVERY 6 HOURS PRN
Status: DISCONTINUED | OUTPATIENT
Start: 2022-03-21 | End: 2022-03-21 | Stop reason: HOSPADM

## 2022-03-21 RX ORDER — HYDROMORPHONE HCL 110MG/55ML
PATIENT CONTROLLED ANALGESIA SYRINGE INTRAVENOUS AS NEEDED
Status: DISCONTINUED | OUTPATIENT
Start: 2022-03-21 | End: 2022-03-21 | Stop reason: SURG

## 2022-03-21 RX ORDER — NALOXONE HCL 0.4 MG/ML
0.2 VIAL (ML) INJECTION AS NEEDED
Status: DISCONTINUED | OUTPATIENT
Start: 2022-03-21 | End: 2022-03-21 | Stop reason: HOSPADM

## 2022-03-21 RX ORDER — KETOROLAC TROMETHAMINE 30 MG/ML
INJECTION, SOLUTION INTRAMUSCULAR; INTRAVENOUS AS NEEDED
Status: DISCONTINUED | OUTPATIENT
Start: 2022-03-21 | End: 2022-03-21 | Stop reason: SURG

## 2022-03-21 RX ORDER — ACETAMINOPHEN 500 MG
500 TABLET ORAL ONCE
Status: COMPLETED | OUTPATIENT
Start: 2022-03-21 | End: 2022-03-21

## 2022-03-21 RX ORDER — LABETALOL HYDROCHLORIDE 5 MG/ML
5 INJECTION, SOLUTION INTRAVENOUS
Status: DISCONTINUED | OUTPATIENT
Start: 2022-03-21 | End: 2022-03-21 | Stop reason: HOSPADM

## 2022-03-21 RX ORDER — GLYCOPYRROLATE 0.2 MG/ML
INJECTION INTRAMUSCULAR; INTRAVENOUS AS NEEDED
Status: DISCONTINUED | OUTPATIENT
Start: 2022-03-21 | End: 2022-03-21 | Stop reason: SURG

## 2022-03-21 RX ORDER — ACETAMINOPHEN 325 MG/1
650 TABLET ORAL ONCE AS NEEDED
Status: DISCONTINUED | OUTPATIENT
Start: 2022-03-21 | End: 2022-03-21 | Stop reason: HOSPADM

## 2022-03-21 RX ADMIN — GABAPENTIN 300 MG: 300 CAPSULE ORAL at 07:02

## 2022-03-21 RX ADMIN — GLYCOPYRROLATE 0.1 MG: 0.2 INJECTION INTRAMUSCULAR; INTRAVENOUS at 09:01

## 2022-03-21 RX ADMIN — GLYCOPYRROLATE 0.2 MG: 0.2 INJECTION INTRAMUSCULAR; INTRAVENOUS at 08:56

## 2022-03-21 RX ADMIN — PROPOFOL 200 MG: 10 INJECTION, EMULSION INTRAVENOUS at 07:31

## 2022-03-21 RX ADMIN — KETAMINE HYDROCHLORIDE 40 MG: 10 INJECTION INTRAMUSCULAR; INTRAVENOUS at 07:43

## 2022-03-21 RX ADMIN — NEOSTIGMINE METHYLSULFATE 1 MG: 0.5 INJECTION INTRAVENOUS at 08:56

## 2022-03-21 RX ADMIN — HYDROMORPHONE HYDROCHLORIDE 0.5 MG: 2 INJECTION, SOLUTION INTRAMUSCULAR; INTRAVENOUS; SUBCUTANEOUS at 08:31

## 2022-03-21 RX ADMIN — DEXAMETHASONE SODIUM PHOSPHATE 10 MG: 10 INJECTION INTRAMUSCULAR; INTRAVENOUS at 07:43

## 2022-03-21 RX ADMIN — KETOROLAC TROMETHAMINE 30 MG: 30 INJECTION, SOLUTION INTRAMUSCULAR at 08:56

## 2022-03-21 RX ADMIN — ACETAMINOPHEN 500 MG: 500 TABLET ORAL at 07:01

## 2022-03-21 RX ADMIN — FENTANYL CITRATE 50 MCG: 50 INJECTION INTRAMUSCULAR; INTRAVENOUS at 07:58

## 2022-03-21 RX ADMIN — CEFAZOLIN SODIUM 2 G: 2 INJECTION, SOLUTION INTRAVENOUS at 07:18

## 2022-03-21 RX ADMIN — FAMOTIDINE 20 MG: 10 INJECTION INTRAVENOUS at 07:02

## 2022-03-21 RX ADMIN — ROCURONIUM BROMIDE 45 MG: 50 INJECTION INTRAVENOUS at 07:31

## 2022-03-21 RX ADMIN — LIDOCAINE HYDROCHLORIDE 100 MG: 20 INJECTION, SOLUTION INFILTRATION; PERINEURAL at 07:31

## 2022-03-21 RX ADMIN — PROPOFOL 75 MCG/KG/MIN: 10 INJECTION, EMULSION INTRAVENOUS at 07:52

## 2022-03-21 RX ADMIN — FENTANYL CITRATE 50 MCG: 50 INJECTION INTRAMUSCULAR; INTRAVENOUS at 08:54

## 2022-03-21 RX ADMIN — ROCURONIUM BROMIDE 5 MG: 50 INJECTION INTRAVENOUS at 08:01

## 2022-03-21 RX ADMIN — SCOPALAMINE 1 PATCH: 1 PATCH, EXTENDED RELEASE TRANSDERMAL at 07:02

## 2022-03-21 RX ADMIN — SODIUM CHLORIDE, POTASSIUM CHLORIDE, SODIUM LACTATE AND CALCIUM CHLORIDE 9 ML/HR: 600; 310; 30; 20 INJECTION, SOLUTION INTRAVENOUS at 06:50

## 2022-03-21 RX ADMIN — ONDANSETRON 4 MG: 2 INJECTION INTRAMUSCULAR; INTRAVENOUS at 10:43

## 2022-03-21 RX ADMIN — HYDROCODONE BITARTRATE AND ACETAMINOPHEN 1 TABLET: 7.5; 325 TABLET ORAL at 10:14

## 2022-03-21 RX ADMIN — FENTANYL CITRATE 50 MCG: 50 INJECTION INTRAMUSCULAR; INTRAVENOUS at 07:30

## 2022-03-21 RX ADMIN — PHENYLEPHRINE HYDROCHLORIDE 100 MCG: 10 INJECTION, SOLUTION INTRAVENOUS at 09:01

## 2022-03-21 RX ADMIN — ONDANSETRON 4 MG: 2 INJECTION INTRAMUSCULAR; INTRAVENOUS at 08:54

## 2022-03-21 RX ADMIN — PROMETHAZINE HYDROCHLORIDE 25 MG: 25 TABLET ORAL at 12:29

## 2022-03-21 NOTE — ANESTHESIA PREPROCEDURE EVALUATION
Anesthesia Evaluation     history of anesthetic complications: PONV  NPO Solid Status: > 8 hours  NPO Liquid Status: > 2 hours           Airway   Mallampati: II  Neck ROM: full  no difficulty expected  Dental - normal exam     Pulmonary - negative pulmonary ROS and normal exam   (-) COPD, asthma, sleep apnea, not a smoker    PE comment: nonlabored  Cardiovascular - negative cardio ROS and normal exam    Rhythm: regular  Rate: normal    (-) hypertension, valvular problems/murmurs, past MI, CAD, dysrhythmias, angina      Neuro/Psych  (+) headaches, psychiatric history Anxiety,    (-) seizures, TIA, CVA  GI/Hepatic/Renal/Endo    (+) obesity,   renal disease stones, thyroid problem (Grave's disease) hypothyroidism  (-) GERD, liver disease, diabetes    Musculoskeletal (-) negative ROS    Abdominal    Substance History      OB/GYN      Comment: Dysfunctional uterine bleeding      Other        ROS/Med Hx Other: H/o [id number]                  Anesthesia Plan    ASA 2     general     intravenous induction     Anesthetic plan, all risks, benefits, and alternatives have been provided, discussed and informed consent has been obtained with: patient.        CODE STATUS:

## 2022-03-21 NOTE — ANESTHESIA POSTPROCEDURE EVALUATION
Patient: Iris Nguyenlps    Procedure Summary     Date: 03/21/22 Room / Location:  [facility]  [facility]    Anesthesia Start: 0725 Anesthesia Stop: 0918    Procedure: TOTAL LAPAROSCOPIC HYSTERECTOMY WITH BILATERAL SALPINGECTOMY (N/A Abdomen) Diagnosis:       Dysfunctional uterine bleeding      (Dysfunctional uterine bleeding [N93.8])    Surgeons: Wilber Han MD Provider: Neil Jessica MD    Anesthesia Type: general ASA Status: 2          Anesthesia Type: general    Vitals  Vitals Value Taken Time   /79 03/21/22 1001   Temp 36.2 °C (97.2 °F) 03/21/22 0915   Pulse 94 03/21/22 1010   Resp 16 03/21/22 0945   SpO2 94 % 03/21/22 1010   Vitals shown include unvalidated device data.        Post Anesthesia Care and Evaluation    Patient location during evaluation: bedside  Patient participation: complete - patient participated  Level of consciousness: awake and alert  Pain management: adequate  Airway patency: patent  Anesthetic complications: No anesthetic complications  PONV Status: controlled  Cardiovascular status: blood pressure returned to baseline and acceptable  Respiratory status: acceptable  Hydration status: acceptable

## 2022-03-21 NOTE — OP NOTE
Operative Note    Date of Surgery:  3/21/2022    Pre-operative Diagnosis:  Dysfunctional Uterine Bleeding.    Post-operative Diagnosis:  Same    Procedure:  Total Laparoscopic Hysterectomy    Primary Surgeon:  Wilber Han MD    Assistant Surgeon:  Gerard Mooney MD who assisted in port placement, maneuvering of camera, and performing one side of hysterectomy.    Anesthesia:  General.    Findings:  10 week size uterus.  Normal ovaries.    Description of Procedure:   Patient was taken to operating room. After adequate general anesthesia was administered, the patient was placed on OR table in dorsal  lithotomy position in [facility]. Patient identified with two identifiers and timeout performed with all team members agreeing to the planned procedure.  Abdomen, vagina, perineum, and rectum  was prepped and draped in a normal, sterile fashion.     A bivalve speculum was placed in the vagina.  The cervix was visualized and anterior lip grasped with single tooth tenaculum.  The uterus was sounded to 8 centimeters with the Valadez sound.  The  uterine manipulator was then placed.  A Ovalle catheter was inserted.  All other instruments were removed from the vagina and attention was turned towards the abdominal portion.    A 1 centimeter infraumbilical incision was made with the knife.  The fascia was grasped with Kocher clamps x2 and elevated and incised with curved Mayos.  The peritoneal cavity was entered bluntly.  A 10 mm Evelio trocar was then placed.  2 stay sutures of 0 Vicryl were placed at either side in the fascia.  The patient was then placed in Trendelenburg position.  A 10 millimeter 0 degree scope was passed through the port.  Inspection of the pelvic and abdominal cavity revealed the findings as noted above.  Accessory port sites were placed one in the left lower quadrant and one in the right lower quadrant.  Each was a 5 millimeter blunt-tipped port.  These were placed under direct  laparoscopic visualization without injury noted.  This point in time proceeded with the hysterectomy.  The ureter was identified on the left side and appeared to be well away from the line of transection.  The uterine tube was mobilized and  was then taken along the Broad Ligament using the LigaSure Advance device.  The left utero-ovarian and round ligament were then coagulated and transected with the LigaSure.  The broad ligament was dissected into the anterior and posterior leaflets.  The anterior leaflet was further dissected with the LigaSure and the bladder flap was dissected off inferiorly.  Then in a similar fashion the posterior leaflet was dissected and the uterine artery on the left side was skeletonized.  The uterine artery was then taken with the ligasure device.  This process was repeated on the right side in a similar fashion.  Attention was then turned back towards the bladder peritoneum which was further dissected off the cervical vaginal fascia.  Anterior colpotomy was then made with the monopolar tip of the LigaSure device.  Entry into the vagina was confirmed by visualization of the uterine manipulator cup.  Then followed the colpotomy around laterally until the specimen was completely excised.  Specimen was passed through the vagina.  The pelvic cavity was irrigated.  The vaginal cuff was then reapproximated with the V-lock barbed delayed absorbable suture.   After completion of the cuff closure, the pelvic cavity was again irrigated.  Good hemostasis was noted.  The accessory port sites were then removed under laparoscopic visualization.  No significant bleeding was noted.  The main trocar site was then removed.  The 2 stay sutures were tied together and the fascia was completely closed at this time.  The skin at the port sites was closed with 4-0 Monocryl in a subcuticular fashion.  Steri-Strips and sterile dressings were applied.     Counts for needles, sponges, laps and instruments were  correct times two at the end of the procedure. I was present and scrubbed for the entire procedure. There were no major complications. The patient was transported to the recovery area in stable condition.    EBL:  10 cc    Specimens:  Uterus and bilateral uterine tubes.    Complications:  None    Disposition:  To PACU    Wilber Han MD

## 2022-03-21 NOTE — ANESTHESIA PROCEDURE NOTES
Airway  Urgency: elective    Date/Time: 3/21/2022 7:36 AM  Airway not difficult    General Information and Staff    Patient location during procedure: OR  Anesthesiologist: Neil Jessica MD  CRNA: Sultana Beckham CRNA    Indications and Patient Condition  Indications for airway management: airway protection    Preoxygenated: yes  Mask difficulty assessment: 1 - vent by mask    Final Airway Details  Final airway type: endotracheal airway      Successful airway: ETT  Cuffed: yes   Successful intubation technique: direct laryngoscopy  Facilitating devices/methods: intubating stylet and anterior pressure/BURP  Endotracheal tube insertion site: oral  Blade: Lj  Blade size: 3  ETT size (mm): 7.0  Cormack-Lehane Classification: grade IIa - partial view of glottis  Placement verified by: capnometry   Measured from: lips  ETT/EBT  to lips (cm): 22  Number of attempts at approach: 1  Assessment: lips, teeth, and gum same as pre-op and atraumatic intubation

## 2022-03-21 NOTE — H&P
[facility]   HISTORY AND PHYSICAL    Patient Name: Iris Dailey  : 1980  MRN: [id number]  Primary Care Physician:  Summer Vasquez APRN  Date of admission: 3/21/2022    Subjective   Subjective     Chief Complaint:  Heavy periods    History of Present Illness  - Patient is a 41 year old female with abnormal bleeding patterns refractory to conservative measures.  Patient used contraceptive methods for cycle control unsuccessfully.    Review of Systems   Constitutional: Negative for chills and fever.   Respiratory: Negative for chest tightness and shortness of breath.    Cardiovascular: Negative for chest pain and palpitations.   Genitourinary: Positive for menstrual problem.        Personal History     Past Medical History:   Diagnosis Date   • Abnormal Pap smear of cervix    • Anxiety    • COVID-19 2022    ASYMPTOMATIC   • Dysfunctional uterine bleeding    • Graves disease     WITH HYPERTHYRODISM.   • History of headache     HORMONE RELATED   • History of MRSA infection     ON LEGS ABOUT 14 YRS AGO   • Kidney stone 2007   • Left breast mass    • PONV (postoperative nausea and vomiting)    • Thyroid eye disease    • Watery eyes        Past Surgical History:   Procedure Laterality Date   • APPENDECTOMY     • BREAST BIOPSY Left     Benign   • BREAST BIOPSY Left 2017    Procedure: EXCISIONAL BIOPSY OF LEFT BREAST MASS WITH NEEDLE LOCALIZATION;  Surgeon: Malcolm Shannon MD;  [place] [facility] [place] [facility]  Service:    • DILATATION AND CURETTAGE      patient states several due to heavy bleeding   • KNEE ARTHROSCOPY Right    • SHOULDER SURGERY Left 2018    LIPOMA       Family History: family history includes Brain cancer in her maternal uncle; Cervical cancer in her mother; Colon cancer in her paternal grandfather; Diabetes in her mother; Heart disease in her father, maternal grandfather, paternal grandfather, and paternal grandmother; Hyperlipidemia in her father and mother;  Hyperthyroidism in her maternal uncle; Hypothyroidism in her paternal grandmother; Kidney disease in her father; Lymphoma in her maternal grandmother; Thyroid cancer in her maternal uncle. Otherwise pertinent FHx was reviewed and not pertinent to current issue.    Social History:  reports that she has never smoked. She has never used smokeless tobacco. She reports that she does not drink alcohol and does not use drugs.    Home Medications:  busPIRone, methIMAzole, and venlafaxine XR    Allergies:  Allergies   Allergen Reactions   • Codeine Hives and Nausea And Vomiting       Objective    Objective     Vitals:        Physical Exam    Result Review    Result Review:  I have personally reviewed the results from the time of this admission to 3/21/2022 06:30 EDT and agree with these findings:  []  Laboratory  []  Microbiology  []  Radiology  []  EKG/Telemetry   []  Cardiology/Vascular   []  Pathology  []  Old records  []  Other:    Hgb 13.6    Assessment/Plan   Assessment / Plan     Brief Patient Summary:  Iris Dailey is a 41 y.o. female with dysfunctional uterine bleeding refractory to conservative measures.    Active Hospital Problems:  Active Hospital Problems    Diagnosis    • **Dysfunctional uterine bleeding      Added automatically from request for surgery [id number]       Plan:   Patient for total laparoscopic hysterectomy.  I discussed the nature of the procedure as well as risks -- bleeding, infection, infection, injury to internal organs, anesthesia.  Patient voices understanding and agrees to proceed.    DVT prophylaxis:  Mechanical DVT prophylaxis orders are present.      Wilber Han MD

## 2022-03-22 LAB
LAB AP CASE REPORT: NORMAL
LAB AP CLINICAL INFORMATION: NORMAL
PATH REPORT.FINAL DX SPEC: NORMAL
PATH REPORT.GROSS SPEC: NORMAL

## 2022-04-14 ENCOUNTER — OFFICE VISIT (OUTPATIENT)
Dept: OBSTETRICS AND GYNECOLOGY | Facility: CLINIC | Age: 42
End: 2022-04-14

## 2022-04-14 VITALS
DIASTOLIC BLOOD PRESSURE: 82 MMHG | SYSTOLIC BLOOD PRESSURE: 125 MMHG | BODY MASS INDEX: 32.78 KG/M2 | HEIGHT: 63 IN | WEIGHT: 185 LBS

## 2022-04-14 DIAGNOSIS — Z98.890 POST-OPERATIVE STATE: Primary | ICD-10-CM

## 2022-04-14 PROCEDURE — 99024 POSTOP FOLLOW-UP VISIT: CPT | Performed by: OBSTETRICS & GYNECOLOGY

## 2022-04-14 RX ORDER — OMEPRAZOLE 40 MG/1
40 CAPSULE, DELAYED RELEASE ORAL DAILY
COMMUNITY
Start: 2022-03-24

## 2022-04-14 RX ORDER — VENLAFAXINE HYDROCHLORIDE 37.5 MG/1
37.5 CAPSULE, EXTENDED RELEASE ORAL DAILY
COMMUNITY
Start: 2022-03-24

## 2022-04-14 RX ORDER — OLMESARTAN MEDOXOMIL 20 MG/1
20 TABLET ORAL DAILY
COMMUNITY
Start: 2022-03-24

## 2022-04-14 NOTE — PROGRESS NOTES
"Akosua Dailey is a 41 y.o. female who presents to the clinic 3 weeks status post total laparoscopic hysterectomy for abnormal uterine bleeding. Eating a regular diet without difficulty. Bowel movements are normal. The patient is not having any pain.    The following portions of the patient's history were reviewed and updated as appropriate:   She  has a past medical history of Abnormal Pap smear of cervix, Anxiety, COVID-19 (01/28/2022), Dysfunctional uterine bleeding, Graves disease, History of headache, History of MRSA infection, Kidney stone (07/07/2007), Left breast mass, PONV (postoperative nausea and vomiting), Thyroid eye disease, and Watery eyes.  She  reports that she has never smoked. She has never used smokeless tobacco. She reports that she does not drink alcohol and does not use drugs.  Current Outpatient Medications   Medication Sig Dispense Refill   • olmesartan (BENICAR) 20 MG tablet Take 20 mg by mouth Daily.     • venlafaxine XR (EFFEXOR-XR) 37.5 MG 24 hr capsule Take 37.5 mg by mouth Daily.     • busPIRone (BUSPAR) 10 MG tablet Take 10 mg by mouth 3 (Three) Times a Day As Needed (ANXIETY). HAS NOT TAKEN IN MONTHS     • methIMAzole (TAPAZOLE) 5 MG tablet Take 2.5 mg by mouth Every Morning.     • omeprazole (priLOSEC) 40 MG capsule Take 40 mg by mouth Daily.     • venlafaxine XR (EFFEXOR-XR) 37.5 MG 24 hr capsule Take 37.5 mg by mouth Every Morning.       No current facility-administered medications for this visit.     She is allergic to codeine..    Review of Systems  Constitutional: negative for chills and fevers  Gastrointestinal: negative for diarrhea, nausea and vomiting  Genitourinary:negative for dysuria and frequency      Objective     /82   Ht 160 cm (62.99\")   Wt 83.9 kg (185 lb)   LMP 02/21/2022   Breastfeeding No   BMI 32.78 kg/m²   General:  alert, appears stated age and cooperative   Abdomen: soft, non-tender   Incisions:   healing well, no drainage, no " erythema, no hernia, no seroma, no swelling, no dehiscence, incision well approximated         Assessment      Doing well postoperatively.  Operative findings again reviewed. Pathology report discussed.      Plan     1. Continue any current medications.  2. Wound care discussed.  3. Activity restrictions: no lifting more than 25 pounds  4. Anticipated return to work: not applicable.  5. Follow up: 3 weeks for postoperative check.      Wilber Han MD

## 2022-04-28 ENCOUNTER — OFFICE VISIT (OUTPATIENT)
Dept: OBSTETRICS AND GYNECOLOGY | Facility: CLINIC | Age: 42
End: 2022-04-28

## 2022-04-28 VITALS
SYSTOLIC BLOOD PRESSURE: 119 MMHG | BODY MASS INDEX: 32.96 KG/M2 | DIASTOLIC BLOOD PRESSURE: 81 MMHG | WEIGHT: 186 LBS | HEIGHT: 63 IN

## 2022-04-28 DIAGNOSIS — Z98.890 POST-OPERATIVE STATE: Primary | ICD-10-CM

## 2022-04-28 PROCEDURE — 99024 POSTOP FOLLOW-UP VISIT: CPT | Performed by: OBSTETRICS & GYNECOLOGY

## 2022-04-28 NOTE — PROGRESS NOTES
"Subjective:       Iris Dailey presents to the clinic 6 weeks following total laparoscopic hysterectomy. Eating a regular diet without difficulty. Bowel movements are Normal.  The patient is not having any pain..      Objective:      /81   Ht 160 cm (62.99\")   Wt 84.4 kg (186 lb)   LMP 02/21/2022   Breastfeeding No   BMI 32.96 kg/m²     General:  alert, appears stated age and cooperative   Pelvic: Vaginal cuff well healed   Incision:   healing well, no drainage, no erythema, no hernia, no seroma, no swelling, no dehiscence, incision well approximated       Assessment:      Doing well postoperatively.      Plan:      1. Continue any current medications.  2. Wound care discussed.  3. Pt is to increase activities as tolerated.  4. Follow up: 1 year for annual.      Wilber Han MD  "

## 2022-05-18 RX ORDER — FLUCONAZOLE 150 MG/1
150 TABLET ORAL ONCE
Qty: 1 TABLET | Refills: 0 | Status: SHIPPED | OUTPATIENT
Start: 2022-05-18 | End: 2022-05-18

## 2022-09-14 ENCOUNTER — TRANSCRIBE ORDERS (OUTPATIENT)
Dept: ADMINISTRATIVE | Facility: HOSPITAL | Age: 42
End: 2022-09-14

## 2022-09-14 DIAGNOSIS — R00.2 PALPITATIONS: Primary | ICD-10-CM

## 2022-09-14 DIAGNOSIS — Z86.16 HISTORY OF COVID-19: ICD-10-CM

## 2022-10-20 ENCOUNTER — HOSPITAL ENCOUNTER (OUTPATIENT)
Dept: CARDIOLOGY | Facility: HOSPITAL | Age: 42
Discharge: HOME | End: 2022-10-20
Admitting: NURSE PRACTITIONER

## 2022-10-20 DIAGNOSIS — R00.2 PALPITATIONS: ICD-10-CM

## 2022-10-20 DIAGNOSIS — Z86.16 HISTORY OF COVID-19: ICD-10-CM

## 2022-10-20 PROCEDURE — 93225 XTRNL ECG REC<48 HRS REC: CPT

## 2022-10-27 LAB
MAXIMAL PREDICTED HEART RATE: 176 BPM
STRESS TARGET HR: 150 BPM

## 2022-12-19 ENCOUNTER — PROCEDURE VISIT (OUTPATIENT)
Dept: OBSTETRICS AND GYNECOLOGY | Facility: CLINIC | Age: 42
End: 2022-12-19

## 2022-12-19 ENCOUNTER — OFFICE VISIT (OUTPATIENT)
Dept: OBSTETRICS AND GYNECOLOGY | Facility: CLINIC | Age: 42
End: 2022-12-19

## 2022-12-19 ENCOUNTER — APPOINTMENT (OUTPATIENT)
Dept: WOMENS IMAGING | Facility: HOSPITAL | Age: 42
End: 2022-12-19
Payer: COMMERCIAL

## 2022-12-19 VITALS
SYSTOLIC BLOOD PRESSURE: 129 MMHG | BODY MASS INDEX: 32.6 KG/M2 | HEIGHT: 63 IN | WEIGHT: 184 LBS | DIASTOLIC BLOOD PRESSURE: 84 MMHG

## 2022-12-19 DIAGNOSIS — Z01.419 VISIT FOR GYNECOLOGIC EXAMINATION: Primary | ICD-10-CM

## 2022-12-19 DIAGNOSIS — Z12.31 VISIT FOR SCREENING MAMMOGRAM: Primary | ICD-10-CM

## 2022-12-19 PROCEDURE — 77067 SCR MAMMO BI INCL CAD: CPT | Performed by: OBSTETRICS & GYNECOLOGY

## 2022-12-19 PROCEDURE — 77063 BREAST TOMOSYNTHESIS BI: CPT | Performed by: OBSTETRICS & GYNECOLOGY

## 2022-12-19 PROCEDURE — 77063 BREAST TOMOSYNTHESIS BI: CPT | Performed by: RADIOLOGY

## 2022-12-19 PROCEDURE — 77067 SCR MAMMO BI INCL CAD: CPT | Performed by: RADIOLOGY

## 2022-12-19 PROCEDURE — 99396 PREV VISIT EST AGE 40-64: CPT | Performed by: OBSTETRICS & GYNECOLOGY

## 2022-12-19 RX ORDER — HYDROXYZINE HYDROCHLORIDE 25 MG/1
25 TABLET, FILM COATED ORAL DAILY
COMMUNITY
Start: 2022-09-08

## 2022-12-19 NOTE — PROGRESS NOTES
[facility]  [address] [facility], [address]  [place] [address]  Phone: [phone] / Fax:  [phone]      12/19/2022    [address] [place] [address]    Summer Vasquez APRN    Chief Complaint   Patient presents with   • Gynecologic Exam      Annual Exam, last pap 10-14-19 NL HPV (-).- TLH for DUB 3-21-22. Mammogram today.        Iris Dailey is here for annual gynecologic exam.  HPI - Patient had hysterectomy for benign indications.  She underwent mammography today and had normal screening one year ago.    Past Medical History:   Diagnosis Date   • Abnormal Pap smear of cervix    • Anxiety    • COVID-19 01/28/2022    ASYMPTOMATIC   • Dysfunctional uterine bleeding    • Graves disease     WITH HYPERTHYRODISM.   • History of headache     HORMONE RELATED   • History of MRSA infection     ON LEGS ABOUT 14 YRS AGO   • Kidney stone 07/07/2007   • Left breast mass    • PONV (postoperative nausea and vomiting)    • Thyroid eye disease    • Watery eyes        Past Surgical History:   Procedure Laterality Date   • APPENDECTOMY     • BREAST BIOPSY Left 2002    Benign   • BREAST BIOPSY Left 9/11/2017    Procedure: EXCISIONAL BIOPSY OF LEFT BREAST MASS WITH NEEDLE LOCALIZATION;  Surgeon: Malcolm Shannon MD;  Location: [facility] OR [facility];  Service:    • DILATATION AND CURETTAGE      patient states several due to heavy bleeding   • KNEE ARTHROSCOPY Right    • SHOULDER SURGERY Left 07/26/2018    LIPOMA   • TOTAL LAPAROSCOPIC HYSTERECTOMY N/A 3/21/2022    Procedure: TOTAL LAPAROSCOPIC HYSTERECTOMY WITH BILATERAL SALPINGECTOMY;  Surgeon: Wilber Han MD;  Location: [facility] OR [facility];  Service: Obstetrics/Gynecology;  Laterality: N/A;       Allergies   Allergen Reactions   • Codeine Hives and Nausea And Vomiting       Social History     Socioeconomic History   • Marital status: Single   Tobacco Use   • Smoking status: Never   • Smokeless tobacco: Never   Vaping Use   • Vaping Use: Never used  "  Substance and Sexual Activity   • Alcohol use: No   • Drug use: No   • Sexual activity: Not Currently     Partners: Male     Birth control/protection: Surgical       Family History   Problem Relation Age of Onset   • Hyperlipidemia Father    • Heart disease Father    • Kidney disease Father    • Hyperlipidemia Mother    • Cervical cancer Mother    • Diabetes Mother    • Colon cancer Paternal Grandfather    • Heart disease Paternal Grandfather    • Hypothyroidism Paternal Grandmother         hypothyroidism   • Heart disease Paternal Grandmother    • Lymphoma Maternal Grandmother    • Heart disease Maternal Grandfather    • Brain cancer Maternal Uncle    • Hyperthyroidism Maternal Uncle         hyerthyroidism/thyroid cancer   • Thyroid cancer Maternal Uncle    • Malig Hyperthermia Neg Hx    • Breast cancer Neg Hx    • Ovarian cancer Neg Hx    • Uterine cancer Neg Hx        Patient's last menstrual period was 2022.    OB History        3    Para   1    Term   0       1    AB   2    Living   1       SAB   2    IAB        Ectopic        Molar        Multiple        Live Births   1          Obstetric Comments   Took birth control 8-12 months.             Vitals:    22 0911   BP: 129/84   Weight: 83.5 kg (184 lb)   Height: 160 cm (62.99\")       Physical Exam  Constitutional:       Appearance: Normal appearance. She is well-developed.   Genitourinary:      Bladder, vagina, uterus and urethral meatus normal.      Right Labia: No tenderness or lesions.     Left Labia: No tenderness or lesions.     Vaginal cuff intact.     No vaginal discharge or tenderness.        Right Adnexa: not tender and not full.     Left Adnexa: not tender and not full.     Cervix is absent.      Uterus is not enlarged or tender.      Uterus is absent.      No urethral tenderness or hypermobility present.      Pelvic exam was performed with patient supine.   Breasts:     Right: No mass or nipple discharge.      Left: No mass " or nipple discharge.   HENT:      Right Ear: External ear normal.      Left Ear: External ear normal.      Nose: Nose normal.   Eyes:      Conjunctiva/sclera: Conjunctivae normal.   Neck:      Thyroid: No thyromegaly.   Cardiovascular:      Rate and Rhythm: Normal rate and regular rhythm.      Heart sounds: Normal heart sounds.   Pulmonary:      Effort: Pulmonary effort is normal.      Breath sounds: No stridor. No wheezing.   Abdominal:      Palpations: Abdomen is soft. There is no mass.      Tenderness: There is no guarding or rebound.   Musculoskeletal:         General: Normal range of motion.      Cervical back: Normal range of motion and neck supple.   Neurological:      Mental Status: She is alert.      Coordination: Coordination normal.   Skin:     General: Skin is warm and dry.   Psychiatric:         Mood and Affect: Mood normal.         Behavior: Behavior normal.         Thought Content: Thought content normal.         Judgment: Judgment normal.   Vitals reviewed. Exam conducted with a chaperone present.         Diagnoses and all orders for this visit:    1. Visit for gynecologic examination (Primary)  -  Discussed importance of regular screening and breast awareness.      Wilber Han MD

## 2023-11-03 ENCOUNTER — OFFICE VISIT (OUTPATIENT)
Dept: ORTHOPEDIC SURGERY | Facility: CLINIC | Age: 43
End: 2023-11-03
Payer: COMMERCIAL

## 2023-11-03 VITALS
OXYGEN SATURATION: 98 % | HEIGHT: 63 IN | HEART RATE: 82 BPM | SYSTOLIC BLOOD PRESSURE: 117 MMHG | WEIGHT: 184 LBS | BODY MASS INDEX: 32.6 KG/M2 | DIASTOLIC BLOOD PRESSURE: 88 MMHG

## 2023-11-03 DIAGNOSIS — M25.561 RIGHT KNEE PAIN, UNSPECIFIED CHRONICITY: Primary | ICD-10-CM

## 2023-11-03 DIAGNOSIS — M17.11 OSTEOARTHRITIS OF RIGHT KNEE, UNSPECIFIED OSTEOARTHRITIS TYPE: ICD-10-CM

## 2023-11-03 RX ORDER — TRIAMCINOLONE ACETONIDE 40 MG/ML
40 INJECTION, SUSPENSION INTRA-ARTICULAR; INTRAMUSCULAR
Status: COMPLETED | OUTPATIENT
Start: 2023-11-03 | End: 2023-11-03

## 2023-11-03 RX ORDER — LIDOCAINE HYDROCHLORIDE 10 MG/ML
5 INJECTION, SOLUTION INFILTRATION; PERINEURAL
Status: COMPLETED | OUTPATIENT
Start: 2023-11-03 | End: 2023-11-03

## 2023-11-03 RX ADMIN — TRIAMCINOLONE ACETONIDE 40 MG: 40 INJECTION, SUSPENSION INTRA-ARTICULAR; INTRAMUSCULAR at 09:19

## 2023-11-03 RX ADMIN — LIDOCAINE HYDROCHLORIDE 5 ML: 10 INJECTION, SOLUTION INFILTRATION; PERINEURAL at 09:19

## 2023-11-03 NOTE — PROGRESS NOTES
"Chief Complaint  Initial Evaluation and Pain of the Right Knee     Subjective      Iris Dailey presents to [facility] for initial evaluation of the right knee. She had pain in the knee for years.  She had an arthroscopy about 7 years ago.  She has had injections in the past and her pain has just started.  She is having pain on the medial aspect of the knee.  She is here today for a right knee steroid injection.      Allergies   Allergen Reactions    Codeine Hives and Nausea And Vomiting        Social History     Socioeconomic History    Marital status: Single   Tobacco Use    Smoking status: Never    Smokeless tobacco: Never   Vaping Use    Vaping Use: Never used   Substance and Sexual Activity    Alcohol use: No    Drug use: No    Sexual activity: Not Currently     Partners: Male     Birth control/protection: Surgical        I reviewed the patient's chief complaint, history of present illness, review of systems, past medical history, surgical history, family history, social history, medications, and allergy list.     Review of Systems     Constitutional: Denies fevers, chills, weight loss  Cardiovascular: Denies chest pain, shortness of breath  Skin: Denies rashes, acute skin changes  Neurologic: Denies headache, loss of consciousness      Vital Signs:   /88   Pulse 82   Ht 160 cm (63\")   Wt 83.5 kg (184 lb)   SpO2 98%   BMI 32.59 kg/m²          Physical Exam  General: Alert. No acute distress    Ortho Exam        RIGHT KNEE Flexion 125. Extension 0. Stable to varus/valgus stress. Stable to anterior/posterior drawer. Neurovascularly intact. Negative Duglas. Negative Lachman. Positive EHL, FHL, HS and TA. Sensation intact to light touch all 5 nerves of the foot. Ambulates with Antalgic gait. Patella is well tracking. Calf supple, non-tender. Positive tenderness to the medial joint line. Negative tenderness to the lateral joint line. Negative Crepitus. Good strength to " hamstrings, quadriceps, dorsiflexors, and plantar flexors.  Knee Extensor Mechanism intact       Large Joint Arthrocentesis: R knee  Date/Time: 11/3/2023 9:19 AM  Consent given by: patient  Site marked: site marked  Timeout: Immediately prior to procedure a time out was called to verify the correct patient, procedure, equipment, support staff and site/side marked as required   Supporting Documentation  Indications: pain   Procedure Details  Location: knee - R knee  Preparation: Patient was prepped and draped in the usual sterile fashion  Needle gauge: 21g.  Medications administered: 5 mL lidocaine 1 %; 40 mg triamcinolone acetonide 40 MG/ML  Patient tolerance: patient tolerated the procedure well with no immediate complications          Imaging Results (Most Recent)       Procedure Component Value Units Date/Time    XR Knee 3 View Right [[id number]] Resulted: 11/03/23 0849     Updated: 11/03/23 0853             Result Review :     X-Ray Report:  Right knee X-Ray  Indication: Evaluation of the right knee  AP/Lateral and [place] view(s)  Findings: Mild to moderate arthritis.   Prior studies available for comparison: yes             Assessment and Plan     Diagnoses and all orders for this visit:    1. Right knee pain, unspecified chronicity (Primary)  -     XR Knee 3 View Right  -     Large Joint Arthrocentesis: R knee    2. Osteoarthritis of right knee, unspecified osteoarthritis type        Discussed the treatment plan with the patient. I reviewed the X-rays that were obtained today with the patient.     Discussed the risks and benefits of conservative measures.  The patient expressed understanding and wished to proceed with a right knee steroid injections.  She tolerated the injection well.       Call or return if worsening symptoms.    Follow Up     PRN      Patient was given instructions and counseling regarding her condition or for health maintenance advice. Please see specific information pulled into the AVS if  appropriate.     Scribed for Dolly Madrigal MD by Dionne Dewitt MA.  11/03/23   08:55 EDT    I have personally performed the services described in this document as scribed by the above individual and it is both accurate and complete. Dolly Madrigal MD 11/03/23

## 2023-12-21 ENCOUNTER — OFFICE VISIT (OUTPATIENT)
Dept: OBSTETRICS AND GYNECOLOGY | Facility: CLINIC | Age: 43
End: 2023-12-21
Payer: COMMERCIAL

## 2023-12-21 ENCOUNTER — PROCEDURE VISIT (OUTPATIENT)
Dept: OBSTETRICS AND GYNECOLOGY | Facility: CLINIC | Age: 43
End: 2023-12-21
Payer: COMMERCIAL

## 2023-12-21 VITALS
DIASTOLIC BLOOD PRESSURE: 87 MMHG | SYSTOLIC BLOOD PRESSURE: 127 MMHG | HEIGHT: 63 IN | WEIGHT: 189 LBS | BODY MASS INDEX: 33.49 KG/M2

## 2023-12-21 DIAGNOSIS — Z01.419 VISIT FOR GYNECOLOGIC EXAMINATION: Primary | ICD-10-CM

## 2023-12-21 DIAGNOSIS — Z12.31 VISIT FOR SCREENING MAMMOGRAM: Primary | ICD-10-CM

## 2023-12-21 NOTE — PROGRESS NOTES
[facility]  [address] [facility], [address]  [place] [address]  Phone: [phone] / Fax:  [phone]      12/21/2023    [address] [place] [address]    Summer Vasquez APRN    Chief Complaint   Patient presents with    Gynecologic Exam     Annual Exam, last pap 10-14-19 NL HPV (-).- TLH for DUB 3-21-22. Mammogram today. Colonoscopy 2021-Polyp, repeat 5 years.       Iris Dailey is here for annual gynecologic exam.  HPI - Patient with last normal pap 4 years ago.  Had hysterectomy for benign indications in 2022.  She had a colonoscopy in 2021 that revealed a polyp; her interval to rescreening is 5 years.  Mammogram was normal last year and patient underwent screening again today.    Past Medical History:   Diagnosis Date    Abnormal Pap smear of cervix     Anxiety     COVID-19 01/28/2022    ASYMPTOMATIC    Dysfunctional uterine bleeding     Graves disease     WITH HYPERTHYRODISM.    History of headache     HORMONE RELATED    History of MRSA infection     ON LEGS ABOUT 14 YRS AGO    Kidney stone 07/07/2007    Left breast mass     Lump or mass in breast 08/24/2017    PONV (postoperative nausea and vomiting)     Thyroid eye disease     Watery eyes        Past Surgical History:   Procedure Laterality Date    APPENDECTOMY      BREAST BIOPSY Left 2002    Benign    BREAST BIOPSY Left 9/11/2017    Procedure: EXCISIONAL BIOPSY OF LEFT BREAST MASS WITH NEEDLE LOCALIZATION;  Surgeon: Malcolm Shannon MD;  [place] [facility] [place] [facility]  Service:     DILATATION AND CURETTAGE      patient states several due to heavy bleeding    KNEE ARTHROSCOPY Right     SHOULDER SURGERY Left 07/26/2018    LIPOMA    TOTAL LAPAROSCOPIC HYSTERECTOMY N/A 3/21/2022    Procedure: TOTAL LAPAROSCOPIC HYSTERECTOMY WITH BILATERAL SALPINGECTOMY;  Surgeon: Wilber Han MD;  [place] [facility];  Service: Obstetrics/Gynecology;  Laterality: N/A;       Allergies   Allergen Reactions    Codeine Hives and Nausea And  "Vomiting       Social History     Socioeconomic History    Marital status: Single   Tobacco Use    Smoking status: Never    Smokeless tobacco: Never   Vaping Use    Vaping Use: Never used   Substance and Sexual Activity    Alcohol use: No    Drug use: No    Sexual activity: Not Currently     Partners: Male     Birth control/protection: Surgical       Family History   Problem Relation Age of Onset    Hyperlipidemia Father     Heart disease Father     Kidney disease Father     Hyperlipidemia Mother     Cervical cancer Mother     Diabetes Mother     Colon cancer Paternal Grandfather     Heart disease Paternal Grandfather     Hypothyroidism Paternal Grandmother         hypothyroidism    Heart disease Paternal Grandmother     Lymphoma Maternal Grandmother     Heart disease Maternal Grandfather     Brain cancer Maternal Uncle     Hyperthyroidism Maternal Uncle         hyerthyroidism/thyroid cancer    Thyroid cancer Maternal Uncle     Malig Hyperthermia Neg Hx     Breast cancer Neg Hx     Ovarian cancer Neg Hx     Uterine cancer Neg Hx        Patient's last menstrual period was 2022.    OB History          3    Para   1    Term   0       1    AB   2    Living   1         SAB   2    IAB        Ectopic        Molar        Multiple        Live Births   1          Obstetric Comments   Took birth control 8-12 months.               Vitals:    23 0827   BP: 127/87   Weight: 85.7 kg (189 lb)   Height: 160 cm (62.99\")       Physical Exam  Constitutional:       Appearance: Normal appearance. She is well-developed.   Genitourinary:      Right Labia: No tenderness or lesions.     Left Labia: No tenderness or lesions.     Vaginal cuff intact.       Right Adnexa: not palpable.     Left Adnexa: not palpable.     Cervix is absent.      Uterus is absent.   Breasts:     Right: No mass or nipple discharge.      Left: No mass or nipple discharge.   HENT:      Right Ear: External ear normal.      Left Ear: External " ear normal.      Nose: Nose normal.   Eyes:      Conjunctiva/sclera: Conjunctivae normal.   Neck:      Thyroid: No thyromegaly.   Cardiovascular:      Rate and Rhythm: Normal rate and regular rhythm.      Heart sounds: Normal heart sounds.   Pulmonary:      Effort: Pulmonary effort is normal.      Breath sounds: No stridor. No wheezing.   Abdominal:      Palpations: Abdomen is soft.      Tenderness: There is no abdominal tenderness. There is no guarding or rebound.   Musculoskeletal:         General: Normal range of motion.      Cervical back: Normal range of motion and neck supple.   Neurological:      Mental Status: She is alert.      Coordination: Coordination normal.   Skin:     General: Skin is warm and dry.   Psychiatric:         Mood and Affect: Mood normal.         Behavior: Behavior normal.         Thought Content: Thought content normal.         Judgment: Judgment normal.   Vitals reviewed. Exam conducted with a chaperone present.         Diagnoses and all orders for this visit:    1. Visit for gynecologic examination (Primary)  -  Discussed importance of regular screening and breast awareness.      Wilber Han MD

## 2024-12-23 ENCOUNTER — OFFICE VISIT (OUTPATIENT)
Dept: OBSTETRICS AND GYNECOLOGY | Facility: CLINIC | Age: 44
End: 2024-12-23
Payer: COMMERCIAL

## 2024-12-23 ENCOUNTER — PROCEDURE VISIT (OUTPATIENT)
Dept: OBSTETRICS AND GYNECOLOGY | Facility: CLINIC | Age: 44
End: 2024-12-23
Payer: COMMERCIAL

## 2024-12-23 VITALS
SYSTOLIC BLOOD PRESSURE: 130 MMHG | WEIGHT: 193 LBS | BODY MASS INDEX: 34.2 KG/M2 | HEIGHT: 63 IN | DIASTOLIC BLOOD PRESSURE: 86 MMHG

## 2024-12-23 DIAGNOSIS — Z01.419 VISIT FOR GYNECOLOGIC EXAMINATION: Primary | ICD-10-CM

## 2024-12-23 DIAGNOSIS — Z12.31 VISIT FOR SCREENING MAMMOGRAM: Primary | ICD-10-CM

## 2024-12-23 RX ORDER — MELOXICAM 15 MG/1
15 TABLET ORAL DAILY
COMMUNITY
Start: 2024-10-30

## 2024-12-23 RX ORDER — VENLAFAXINE HYDROCHLORIDE 75 MG/1
75 CAPSULE, EXTENDED RELEASE ORAL DAILY
COMMUNITY
Start: 2024-08-29

## 2024-12-23 RX ORDER — BACLOFEN 10 MG/1
10 TABLET ORAL
COMMUNITY
Start: 2024-10-30

## 2024-12-23 NOTE — PROGRESS NOTES
[facility]  [address] [facility], [address]  [place] [address]  Phone: [phone] / Fax:  [phone]      12/23/2024    [address] [place] [address]    Summer Vasquez APRN    Chief Complaint   Patient presents with    Gynecologic Exam     Annual Exam, last pap 10-14-19 NL HPV (-).- TLH for DUB 3-21-22. Mammogram today. Colonoscopy 2021-Polyp, repeat 5 years.       Iris Dailey is here for annual gynecologic exam.  HPI - Patient had hysterectomy for benign indication.  She had a benign appearing mammogram one year ago and underwent screening again today.  She had a colonoscopy in 2021 with benign findings but has a 5 year interval to follow up.    Past Medical History:   Diagnosis Date    Abnormal Pap smear of cervix     Anxiety     COVID-19 01/28/2022    ASYMPTOMATIC    Dysfunctional uterine bleeding     Graves disease     WITH HYPERTHYRODISM.    History of headache     HORMONE RELATED    History of MRSA infection     ON LEGS ABOUT 14 YRS AGO    Kidney stone 07/07/2007    Left breast mass     Lump or mass in breast 08/24/2017    PONV (postoperative nausea and vomiting)     Thyroid eye disease     Watery eyes        Past Surgical History:   Procedure Laterality Date    APPENDECTOMY      BREAST BIOPSY Left 2002    Benign    BREAST BIOPSY Left 9/11/2017    Procedure: EXCISIONAL BIOPSY OF LEFT BREAST MASS WITH NEEDLE LOCALIZATION;  Surgeon: Malcolm Shannon MD;  Location: [facility];  Service:     DILATATION AND CURETTAGE      patient states several due to heavy bleeding    KNEE ARTHROSCOPY Right     SHOULDER SURGERY Left 07/26/2018    LIPOMA    TOTAL LAPAROSCOPIC HYSTERECTOMY N/A 3/21/2022    Procedure: TOTAL LAPAROSCOPIC HYSTERECTOMY WITH BILATERAL SALPINGECTOMY;  Surgeon: Wilber Han MD;  Location: [facility];  Service: Obstetrics/Gynecology;  Laterality: N/A;       Allergies   Allergen Reactions    Codeine Hives and Nausea And Vomiting       Social History  "    Socioeconomic History    Marital status: Single   Tobacco Use    Smoking status: Never    Smokeless tobacco: Never   Vaping Use    Vaping status: Never Used   Substance and Sexual Activity    Alcohol use: No    Drug use: No    Sexual activity: Not Currently     Partners: Male     Birth control/protection: Surgical       Family History   Problem Relation Age of Onset    Hyperlipidemia Father     Heart disease Father     Kidney disease Father     Hyperlipidemia Mother     Cervical cancer Mother     Diabetes Mother     Colon cancer Paternal Grandfather     Heart disease Paternal Grandfather     Hypothyroidism Paternal Grandmother         hypothyroidism    Heart disease Paternal Grandmother     Lymphoma Maternal Grandmother     Heart disease Maternal Grandfather     Brain cancer Maternal Uncle     Hyperthyroidism Maternal Uncle         hyerthyroidism/thyroid cancer    Thyroid cancer Maternal Uncle     Malig Hyperthermia Neg Hx     Breast cancer Neg Hx     Ovarian cancer Neg Hx     Uterine cancer Neg Hx        Patient's last menstrual period was 2022.    OB History          3    Para   1    Term   0       1    AB   2    Living   1         SAB   2    IAB        Ectopic        Molar        Multiple        Live Births   1          Obstetric Comments   Took birth control 8-12 months.               Vitals:    24 0902   BP: 130/86   Weight: 87.5 kg (193 lb)   Height: 160 cm (63\")       Physical Exam  Constitutional:       Appearance: Normal appearance. She is well-developed.   Genitourinary:      Bladder and urethral meatus normal.      Right Labia: No tenderness or lesions.     Left Labia: No tenderness or lesions.     Vaginal cuff intact.     No vaginal discharge or tenderness.        Right Adnexa: not palpable.     Left Adnexa: not palpable.     Cervix is absent.      Uterus is absent.      No urethral tenderness or hypermobility present.   Breasts:     Right: No mass or nipple discharge.      " Left: No mass or nipple discharge.   HENT:      Right Ear: External ear normal.      Left Ear: External ear normal.      Nose: Nose normal.   Eyes:      Conjunctiva/sclera: Conjunctivae normal.   Neck:      Thyroid: No thyromegaly.   Cardiovascular:      Rate and Rhythm: Normal rate and regular rhythm.      Heart sounds: Normal heart sounds.   Pulmonary:      Effort: Pulmonary effort is normal.      Breath sounds: No stridor. No wheezing.   Abdominal:      Palpations: Abdomen is soft.      Tenderness: There is no abdominal tenderness. There is no guarding or rebound.   Musculoskeletal:         General: Normal range of motion.      Cervical back: Normal range of motion and neck supple.   Neurological:      Mental Status: She is alert.      Coordination: Coordination normal.   Skin:     General: Skin is warm and dry.   Psychiatric:         Mood and Affect: Mood normal.         Behavior: Behavior normal.         Thought Content: Thought content normal.         Judgment: Judgment normal.   Vitals reviewed. Exam conducted with a chaperone present.         Diagnoses and all orders for this visit:    1. Visit for gynecologic examination (Primary)  -  Discussed importance of regular screening and breast awareness.      Wilber Han MD

## 2025-08-14 ENCOUNTER — TRANSCRIBE ORDERS (OUTPATIENT)
Dept: ADMINISTRATIVE | Facility: HOSPITAL | Age: 45
End: 2025-08-14
Payer: COMMERCIAL

## 2025-08-14 DIAGNOSIS — M54.51 VERTEBROGENIC LOW BACK PAIN: Primary | ICD-10-CM

## 2025-08-22 ENCOUNTER — HOSPITAL ENCOUNTER (OUTPATIENT)
Dept: GENERAL RADIOLOGY | Facility: HOSPITAL | Age: 45
Discharge: HOME | End: 2025-08-22
Admitting: NURSE PRACTITIONER
Payer: COMMERCIAL

## 2025-08-22 DIAGNOSIS — M54.51 VERTEBROGENIC LOW BACK PAIN: ICD-10-CM

## 2025-08-22 PROCEDURE — 72114 X-RAY EXAM L-S SPINE BENDING: CPT

## (undated) DEVICE — SYRINGE, LUER LOCK, 60ML: Brand: MEDLINE

## (undated) DEVICE — SUT VIC 3/0 SH 27IN J416H

## (undated) DEVICE — PK LAP GYN TOWER 40

## (undated) DEVICE — ENDOCUT SCISSOR TIP, DISPOSABLE: Brand: RENEW

## (undated) DEVICE — ELECTRD BLD EZ CLN MOD 2.5IN

## (undated) DEVICE — 3M™ STERI-STRIP™ ANTIMICROBIAL SKIN CLOSURES 1/2 INCH X 4 INCHES 50/CARTON 4 CARTONS/CASE A1847: Brand: 3M™ STERI-STRIP™

## (undated) DEVICE — DRSNG SURESITE WNDW 4X4.5

## (undated) DEVICE — UNDYED BRAIDED (POLYGLACTIN 910), SYNTHETIC ABSORBABLE SUTURE: Brand: COATED VICRYL

## (undated) DEVICE — SPNG GZ WOVN 4X4IN 12PLY 10/BX STRL

## (undated) DEVICE — MANIP UTER ADVINCULA DELINEATOR 3.5CM

## (undated) DEVICE — ENDOPATH XCEL BLUNT TIP TROCARS WITH SMOOTH SLEEVES: Brand: ENDOPATH XCEL

## (undated) DEVICE — NDL HYPO PRECISIONGLIDE REG 25G 1 1/2

## (undated) DEVICE — PK PROC MINOR TOWER 40

## (undated) DEVICE — RETRACTABLE L-HOOK LAPAROSCOPIC SEALER/DIVIDER: Brand: LIGASURE

## (undated) DEVICE — SUTURING DEVICE: Brand: ENDO STITCH

## (undated) DEVICE — 40585 XL ADVANCED TRENDELENBURG POSITIONING KIT: Brand: 40585 XL ADVANCED TRENDELENBURG POSITIONING KIT

## (undated) DEVICE — PATIENT RETURN ELECTRODE, SINGLE-USE, CONTACT QUALITY MONITORING, ADULT, WITH 9FT CORD, FOR PATIENTS WEIGING OVER 33LBS. (15KG): Brand: MEGADYNE

## (undated) DEVICE — 2, DISPOSABLE SUCTION/IRRIGATOR WITH DISPOSABLE TIP: Brand: STRYKEFLOW

## (undated) DEVICE — GLV SURG BIOGEL LTX PF 8

## (undated) DEVICE — GLV SURG BIOGEL LTX PF 7 1/2

## (undated) DEVICE — ST IRR CYSTO W/SPK 77IN LF

## (undated) DEVICE — SYR LL TP 10ML STRL

## (undated) DEVICE — POLYESTER SINGLE STITCH RELOAD: Brand: SURGIDAC

## (undated) DEVICE — KT CLN CLEANOR SCPE

## (undated) DEVICE — LAPAROSCOPIC SMOKE ELIMINATION DEVICE: Brand: PNEUVIEW XE

## (undated) DEVICE — IRRIGATOR BULB ASEPTO 60CC STRL

## (undated) DEVICE — ENDOPATH XCEL DILATING TIP TROCARS WITH STABILITY SLEEVES: Brand: ENDOPATH XCEL

## (undated) DEVICE — 3M(TM) TEGADERM(TM) IV TRANSPARENT FILM DRESSING WITH BORDER 1650: Brand: 3M™ TEGADERM™

## (undated) DEVICE — ADHS SKIN SURG TISS VISC PREMIERPRO EXOFIN HI/VISC FAST/DRY

## (undated) DEVICE — ENCORE® LATEX ORTHO SIZE 8, STERILE LATEX POWDER-FREE SURGICAL GLOVE: Brand: ENCORE

## (undated) DEVICE — 3M™ STERI-STRIP™ COMPOUND BENZOIN TINCTURE 40 BAGS/CARTON 4 CARTONS/CASE C1544: Brand: 3M™ STERI-STRIP™

## (undated) DEVICE — APPL CHLORAPREP W/TINT 26ML ORNG

## (undated) DEVICE — COVER,MAYO STAND,STERILE: Brand: MEDLINE

## (undated) DEVICE — SUT SILK 2/0 FS BLK 18IN 685G

## (undated) DEVICE — TOWEL,OR,DSP,ST,BLUE,STD,4/PK,20PK/CS: Brand: MEDLINE

## (undated) DEVICE — 100% SILICONE FOLEY TRAY,16 FR/CH (5.3 MM), 5 ML CATHETER PRE-CONNECTED TO 2000 ML DRAINAGE BAG WITH LUER-LOCK SAMPLING AND ADHESIVE CATHETER SECUREMENT: Brand: DOVER